# Patient Record
Sex: FEMALE | Race: BLACK OR AFRICAN AMERICAN | NOT HISPANIC OR LATINO | Employment: PART TIME | URBAN - METROPOLITAN AREA
[De-identification: names, ages, dates, MRNs, and addresses within clinical notes are randomized per-mention and may not be internally consistent; named-entity substitution may affect disease eponyms.]

---

## 2020-07-30 ENCOUNTER — TELEPHONE (OUTPATIENT)
Dept: OBSTETRICS AND GYNECOLOGY | Facility: CLINIC | Age: 40
End: 2020-07-30

## 2020-07-30 NOTE — TELEPHONE ENCOUNTER
----- Message from Stephenie Zhao sent at 7/30/2020 10:19 AM CDT -----  Type:  Patient Returning Call    Who Called:pt   Who Left Message for Patient:galina   Does the patient know what this is regarding?:appt   Would the patient rather a call back or a response via [facility]? Callback   Best Call Back Number:[phone]   Additional Information:

## 2020-07-30 NOTE — TELEPHONE ENCOUNTER
----- Message from Amy Guerra sent at 7/30/2020  9:18 AM CDT -----  Regarding: pt  Pt would like to schedule an appt. Pt would like to est care with Po Portillo. Epic wouldn't let me schedule  Please call back [phone]

## 2020-07-30 NOTE — TELEPHONE ENCOUNTER
----- Message from Shayne Hernandez sent at 7/30/2020  8:45 AM CDT -----  Contact: self  Type:  Patient Returning Call    Who Called:pt  Who Left Message for Patient:sascha  Does the patient know what this is regarding?:appt  Would the patient rather a call back or a response via [facility]? Call back  Best Call Back Number: [phone]  Additional Information: none

## 2020-07-30 NOTE — TELEPHONE ENCOUNTER
----- Message from Yudi Tripathi sent at 7/30/2020  8:31 AM CDT -----  Regarding: Former patient from 2015 or 2016  Contact: Patient  Patient has medicaid and is a former patient of Dr Maldonado and is hoping Dr Maldonado would see her as a GYN patient. Please call to advise at Ph .[phone] (home)

## 2020-07-30 NOTE — TELEPHONE ENCOUNTER
Advised pt that we have no record of her as a pt in recent years and can't take her Medicaid. Verb.understanding.

## 2022-01-23 ENCOUNTER — OUTSIDE PLACE OF SERVICE (OUTPATIENT)
Dept: SURGERY | Facility: CLINIC | Age: 42
End: 2022-01-23

## 2022-01-23 ENCOUNTER — OUTSIDE PLACE OF SERVICE (OUTPATIENT)
Dept: SURGERY | Facility: CLINIC | Age: 42
End: 2022-01-23
Payer: MEDICAID

## 2022-01-23 PROCEDURE — 99222 PR INITIAL HOSPITAL CARE,LEVL II: ICD-10-PCS | Mod: 57,,, | Performed by: SURGERY

## 2022-01-23 PROCEDURE — 44206 LAP PART COLECTOMY W/STOMA: CPT | Mod: ,,, | Performed by: SURGERY

## 2022-01-23 PROCEDURE — 44206 PR LAP,SURG,COLECTOMY,W/END COLOST & CLOSUR: ICD-10-PCS | Mod: ,,, | Performed by: SURGERY

## 2022-01-23 PROCEDURE — 99222 1ST HOSP IP/OBS MODERATE 55: CPT | Mod: 57,,, | Performed by: SURGERY

## 2022-01-24 LAB
ABS NRBC COUNT: 0 THOU/UL (ref 0–0.01)
ABSOLUTE BASOPHIL: 0 10*3/UL (ref 0–0.3)
ABSOLUTE EOSINOPHIL: 0 10*3/UL (ref 0–0.6)
ABSOLUTE IMMATURE GRAN: 0.08 THOU/UL (ref 0–0.03)
ABSOLUTE LYMPHOCYTE: 1.4 10*3/UL (ref 1.2–4)
ABSOLUTE MONOCYTE: 1.1 10*3/UL (ref 0.1–0.8)
ANION GAP SERPL CALC-SCNC: 9 MMOL/L (ref 3–11)
BASOPHILS NFR BLD: 0.1 % (ref 0–3)
BUN SERPL-MCNC: 8 MG/DL (ref 7–18)
CALCIUM SERPL-MCNC: 9.2 MG/DL (ref 8.5–10.1)
CHLORIDE SERPL-SCNC: 100 MMOL/L (ref 98–107)
CO2 SERPL-SCNC: 24 MMOL/L (ref 21–32)
CREAT SERPL-MCNC: 0.72 MG/DL (ref 0.55–1.02)
EOSINOPHIL NFR BLD: 0.2 % (ref 0–6)
ERYTHROCYTE [DISTWIDTH] IN BLOOD BY AUTOMATED COUNT: 16.8 % (ref 0–15.5)
GFR ESTIMATION: > 60
GLUCOSE SERPL-MCNC: 108 MG/DL (ref 74–106)
HCT VFR BLD AUTO: 40.1 % (ref 37–47)
HGB BLD-MCNC: 12.2 G/DL (ref 12–16)
IMMATURE GRANULOCYTES: 0.5 % (ref 0–0.43)
LYMPHOCYTES NFR BLD: 8.2 % (ref 20–45)
MCH RBC QN AUTO: 23.9 PG (ref 27–32)
MCHC RBC AUTO-ENTMCNC: 30.4 % (ref 32–36)
MCV RBC AUTO: 78.5 FL (ref 80–99)
MONOCYTES NFR BLD: 6 % (ref 2–10)
NEUTROPHILS # BLD AUTO: 14.9 10*3/UL (ref 1.4–7)
NEUTROPHILS NFR BLD: 85 % (ref 50–80)
NUCLEATED RED BLOOD CELLS: 0 % (ref 0–0.2)
PERIPHERAL SMEAR: NORMAL
PLATELETS: 510 10*3/UL (ref 130–400)
PMV BLD AUTO: 9.3 FL (ref 9.2–12.2)
POTASSIUM SERPL-SCNC: 4.1 MMOL/L (ref 3.5–5.1)
RBC # BLD AUTO: 5.11 10*6/UL (ref 4.2–5.4)
SODIUM BLD-SCNC: 133 MMOL/L (ref 131–143)
WBC # BLD: 17.5 10*3/UL (ref 4.5–10)

## 2022-01-25 LAB
ABS NRBC COUNT: 0 THOU/UL (ref 0–0.01)
ANION GAP SERPL CALC-SCNC: 13 MMOL/L (ref 3–11)
BUN SERPL-MCNC: 9 MG/DL (ref 7–18)
CALCIUM SERPL-MCNC: 8.7 MG/DL (ref 8.5–10.1)
CHLORIDE SERPL-SCNC: 103 MMOL/L (ref 98–107)
CO2 SERPL-SCNC: 20 MMOL/L (ref 21–32)
CREAT SERPL-MCNC: 0.68 MG/DL (ref 0.55–1.02)
ERYTHROCYTE [DISTWIDTH] IN BLOOD BY AUTOMATED COUNT: 17.1 % (ref 0–15.5)
GFR ESTIMATION: > 60
GLUCOSE SERPL-MCNC: 83 MG/DL (ref 74–106)
HCT VFR BLD AUTO: 39 % (ref 37–47)
HGB BLD-MCNC: 11.8 G/DL (ref 12–16)
MAGNESIUM SERPL-MCNC: 2.8 MG/DL (ref 1.6–2.6)
MCH RBC QN AUTO: 24 PG (ref 27–32)
MCHC RBC AUTO-ENTMCNC: 30.3 % (ref 32–36)
MCV RBC AUTO: 79.3 FL (ref 80–99)
NUCLEATED RED BLOOD CELLS: 0 % (ref 0–0.2)
PHOSPHATE FLD-MCNC: 3.7 MG/DL (ref 4.5–6.7)
PLATELETS: 383 10*3/UL (ref 130–400)
PMV BLD AUTO: 11 FL (ref 9.2–12.2)
POTASSIUM SERPL-SCNC: 4.3 MMOL/L (ref 3.5–5.1)
RBC # BLD AUTO: 4.92 10*6/UL (ref 4.2–5.4)
SODIUM BLD-SCNC: 136 MMOL/L (ref 131–143)
WBC # BLD: 13.3 10*3/UL (ref 4.5–10)

## 2022-01-26 LAB
ABS NRBC COUNT: 0 THOU/UL (ref 0–0.01)
ANION GAP SERPL CALC-SCNC: 8 MMOL/L (ref 3–11)
BUN SERPL-MCNC: 9 MG/DL (ref 7–18)
CALCIUM SERPL-MCNC: 8.6 MG/DL (ref 8.5–10.1)
CHLORIDE SERPL-SCNC: 103 MMOL/L (ref 98–107)
CO2 SERPL-SCNC: 25 MMOL/L (ref 21–32)
CREAT SERPL-MCNC: 0.62 MG/DL (ref 0.55–1.02)
ERYTHROCYTE [DISTWIDTH] IN BLOOD BY AUTOMATED COUNT: 16.6 % (ref 0–15.5)
GFR ESTIMATION: > 60
GLUCOSE SERPL-MCNC: 77 MG/DL (ref 74–106)
HCT VFR BLD AUTO: 34.8 % (ref 37–47)
HGB BLD-MCNC: 10.4 G/DL (ref 12–16)
MCH RBC QN AUTO: 23.9 PG (ref 27–32)
MCHC RBC AUTO-ENTMCNC: 29.9 % (ref 32–36)
MCV RBC AUTO: 79.8 FL (ref 80–99)
NUCLEATED RED BLOOD CELLS: 0 % (ref 0–0.2)
PLATELETS: 441 10*3/UL (ref 130–400)
PMV BLD AUTO: 8.8 FL (ref 9.2–12.2)
POTASSIUM SERPL-SCNC: 3.9 MMOL/L (ref 3.5–5.1)
RBC # BLD AUTO: 4.36 10*6/UL (ref 4.2–5.4)
SODIUM BLD-SCNC: 136 MMOL/L (ref 131–143)
SPECIMEN TO PATHOLOGY: NORMAL
TSH SERPL DL<=0.005 MIU/L-ACNC: 2.15 UIU/ML (ref 0.36–3.74)
WBC # BLD: 11.2 10*3/UL (ref 4.5–10)

## 2022-01-27 LAB
ABS NRBC COUNT: 0 THOU/UL (ref 0–0.01)
ANION GAP SERPL CALC-SCNC: 8 MMOL/L (ref 3–11)
BUN SERPL-MCNC: 9 MG/DL (ref 7–18)
CALCIUM SERPL-MCNC: 8.7 MG/DL (ref 8.5–10.1)
CHLORIDE SERPL-SCNC: 101 MMOL/L (ref 98–107)
CO2 SERPL-SCNC: 25 MMOL/L (ref 21–32)
CREAT SERPL-MCNC: 0.62 MG/DL (ref 0.55–1.02)
ERYTHROCYTE [DISTWIDTH] IN BLOOD BY AUTOMATED COUNT: 16.4 % (ref 0–15.5)
GFR ESTIMATION: > 60
GLUCOSE SERPL-MCNC: 68 MG/DL (ref 74–106)
HCT VFR BLD AUTO: 32.4 % (ref 37–47)
HGB BLD-MCNC: 10.1 G/DL (ref 12–16)
MCH RBC QN AUTO: 24 PG (ref 27–32)
MCHC RBC AUTO-ENTMCNC: 31.2 % (ref 32–36)
MCV RBC AUTO: 77.1 FL (ref 80–99)
NUCLEATED RED BLOOD CELLS: 0 % (ref 0–0.2)
PLATELETS: 470 10*3/UL (ref 130–400)
PMV BLD AUTO: 9.2 FL (ref 9.2–12.2)
POTASSIUM SERPL-SCNC: 3.7 MMOL/L (ref 3.5–5.1)
RBC # BLD AUTO: 4.2 10*6/UL (ref 4.2–5.4)
SODIUM BLD-SCNC: 134 MMOL/L (ref 131–143)
WBC # BLD: 11.7 10*3/UL (ref 4.5–10)

## 2022-01-30 LAB
ABS NRBC COUNT: 0 THOU/UL (ref 0–0.01)
ANION GAP SERPL CALC-SCNC: 7 MMOL/L (ref 3–11)
BUN SERPL-MCNC: 6 MG/DL (ref 7–18)
CALCIUM SERPL-MCNC: 8.9 MG/DL (ref 8.5–10.1)
CHLORIDE SERPL-SCNC: 102 MMOL/L (ref 98–107)
CO2 SERPL-SCNC: 28 MMOL/L (ref 21–32)
CREAT SERPL-MCNC: 0.72 MG/DL (ref 0.55–1.02)
ERYTHROCYTE [DISTWIDTH] IN BLOOD BY AUTOMATED COUNT: 16.5 % (ref 0–15.5)
GFR ESTIMATION: > 60
GLUCOSE SERPL-MCNC: 87 MG/DL (ref 74–106)
HCT VFR BLD AUTO: 35.4 % (ref 37–47)
HGB BLD-MCNC: 10.5 G/DL (ref 12–16)
MCH RBC QN AUTO: 23.7 PG (ref 27–32)
MCHC RBC AUTO-ENTMCNC: 29.7 % (ref 32–36)
MCV RBC AUTO: 79.9 FL (ref 80–99)
NUCLEATED RED BLOOD CELLS: 0 % (ref 0–0.2)
PLATELETS: 531 10*3/UL (ref 130–400)
PMV BLD AUTO: 8.8 FL (ref 9.2–12.2)
POTASSIUM SERPL-SCNC: 3.8 MMOL/L (ref 3.5–5.1)
RBC # BLD AUTO: 4.43 10*6/UL (ref 4.2–5.4)
SODIUM BLD-SCNC: 137 MMOL/L (ref 131–143)
WBC # BLD: 8.1 10*3/UL (ref 4.5–10)

## 2022-02-01 ENCOUNTER — OUTSIDE PLACE OF SERVICE (OUTPATIENT)
Dept: SURGERY | Facility: CLINIC | Age: 42
End: 2022-02-01
Payer: MEDICAID

## 2022-02-01 LAB
ABS NRBC COUNT: 0 THOU/UL (ref 0–0.01)
ANION GAP SERPL CALC-SCNC: 10 MMOL/L (ref 3–11)
BUN SERPL-MCNC: 8 MG/DL (ref 7–18)
CALCIUM SERPL-MCNC: 8.7 MG/DL (ref 8.5–10.1)
CHLORIDE SERPL-SCNC: 100 MMOL/L (ref 98–107)
CO2 SERPL-SCNC: 23 MMOL/L (ref 21–32)
CREAT SERPL-MCNC: 0.66 MG/DL (ref 0.55–1.02)
ERYTHROCYTE [DISTWIDTH] IN BLOOD BY AUTOMATED COUNT: 16.7 % (ref 0–15.5)
GFR ESTIMATION: > 60
GLUCOSE SERPL-MCNC: 79 MG/DL (ref 74–106)
HCT VFR BLD AUTO: 36 % (ref 37–47)
HGB BLD-MCNC: 10.9 G/DL (ref 12–16)
MCH RBC QN AUTO: 23.9 PG (ref 27–32)
MCHC RBC AUTO-ENTMCNC: 30.3 % (ref 32–36)
MCV RBC AUTO: 78.9 FL (ref 80–99)
NUCLEATED RED BLOOD CELLS: 0 % (ref 0–0.2)
PLATELETS: 548 10*3/UL (ref 130–400)
PMV BLD AUTO: 9 FL (ref 9.2–12.2)
POTASSIUM SERPL-SCNC: 3.4 MMOL/L (ref 3.5–5.1)
RBC # BLD AUTO: 4.56 10*6/UL (ref 4.2–5.4)
SODIUM BLD-SCNC: 133 MMOL/L (ref 131–143)
WBC # BLD: 10.2 10*3/UL (ref 4.5–10)

## 2022-02-01 PROCEDURE — 99024 PR POST-OP FOLLOW-UP VISIT: ICD-10-PCS | Mod: ,,, | Performed by: SURGERY

## 2022-02-01 PROCEDURE — 99024 POSTOP FOLLOW-UP VISIT: CPT | Mod: ,,, | Performed by: SURGERY

## 2022-02-03 ENCOUNTER — TELEPHONE (OUTPATIENT)
Dept: SURGERY | Facility: CLINIC | Age: 42
End: 2022-02-03
Payer: MEDICAID

## 2022-02-03 RX ORDER — ONDANSETRON 4 MG/1
8 TABLET, FILM COATED ORAL EVERY 8 HOURS PRN
COMMUNITY
Start: 2022-02-03 | End: 2022-03-03

## 2022-02-03 NOTE — TELEPHONE ENCOUNTER
----- Message from Cinthya Chawla sent at 2/3/2022  9:05 AM CST -----  Regarding: medicine  Patient had surgery with Dr. Recio and was discarded on 2/1/22 and patient states she was not given any medicine for nausea or her high blood pressure. Good phone #: [phone]

## 2022-02-03 NOTE — TELEPHONE ENCOUNTER
Pt s/p Sigmoid colectomy, creation of colostomy,... . 01-23-22, d/c 02-01-22    Pt states she is still nauseated and would like something to take for it.  Will get with dr. Recio.

## 2022-02-03 NOTE — TELEPHONE ENCOUNTER
Per Dr. Recio: zofran 4 mg.    Zofran 4 mg   1 tab PO Q8H PRN  #30, 0 refills    Called in to pharmacy ([facility] )

## 2022-02-14 ENCOUNTER — OFFICE VISIT (OUTPATIENT)
Dept: SURGERY | Facility: CLINIC | Age: 42
End: 2022-02-14
Payer: MEDICAID

## 2022-02-14 VITALS — HEIGHT: 66 IN | WEIGHT: 293 LBS | BODY MASS INDEX: 47.09 KG/M2 | RESPIRATION RATE: 20 BRPM

## 2022-02-14 DIAGNOSIS — Z76.89 ENCOUNTER TO ESTABLISH CARE: ICD-10-CM

## 2022-02-14 DIAGNOSIS — K57.20 PERFORATION OF SIGMOID COLON DUE TO DIVERTICULITIS: Primary | ICD-10-CM

## 2022-02-14 PROCEDURE — 1160F RVW MEDS BY RX/DR IN RCRD: CPT | Mod: CPTII,S$GLB,, | Performed by: SURGERY

## 2022-02-14 PROCEDURE — 1159F PR MEDICATION LIST DOCUMENTED IN MEDICAL RECORD: ICD-10-PCS | Mod: CPTII,S$GLB,, | Performed by: SURGERY

## 2022-02-14 PROCEDURE — 99024 PR POST-OP FOLLOW-UP VISIT: ICD-10-PCS | Mod: S$GLB,,, | Performed by: SURGERY

## 2022-02-14 PROCEDURE — 99024 POSTOP FOLLOW-UP VISIT: CPT | Mod: S$GLB,,, | Performed by: SURGERY

## 2022-02-14 PROCEDURE — 3008F BODY MASS INDEX DOCD: CPT | Mod: CPTII,S$GLB,, | Performed by: SURGERY

## 2022-02-14 PROCEDURE — 1159F MED LIST DOCD IN RCRD: CPT | Mod: CPTII,S$GLB,, | Performed by: SURGERY

## 2022-02-14 PROCEDURE — 3008F PR BODY MASS INDEX (BMI) DOCUMENTED: ICD-10-PCS | Mod: CPTII,S$GLB,, | Performed by: SURGERY

## 2022-02-14 PROCEDURE — 1160F PR REVIEW ALL MEDS BY PRESCRIBER/CLIN PHARMACIST DOCUMENTED: ICD-10-PCS | Mod: CPTII,S$GLB,, | Performed by: SURGERY

## 2022-02-14 RX ORDER — BENZONATATE 100 MG/1
100 CAPSULE ORAL
COMMUNITY
End: 2022-10-18

## 2022-02-14 RX ORDER — POTASSIUM CHLORIDE 20 MEQ/1
TABLET, EXTENDED RELEASE ORAL
COMMUNITY

## 2022-02-14 RX ORDER — METOPROLOL TARTRATE 50 MG/1
50 TABLET ORAL DAILY
COMMUNITY
Start: 2022-02-03

## 2022-02-14 RX ORDER — OXYCODONE AND ACETAMINOPHEN 5; 325 MG/1; MG/1
TABLET ORAL
COMMUNITY
Start: 2022-02-01 | End: 2023-01-31

## 2022-02-14 RX ORDER — CLONIDINE HYDROCHLORIDE 0.2 MG/1
TABLET ORAL
COMMUNITY
Start: 2022-02-12 | End: 2022-03-02

## 2022-02-14 NOTE — PROGRESS NOTES
Patient doing well in the postoperative period, incisions are all healing well, not having any problems with her colostomy bag.  Patient will need an evaluation by the gynecology team to evaluate for hysterectomy, ideally the patient would get a hysterectomy and reversal at the same time.  Patient is also morbidly obese which is going to make her high risk for complications during surgery.  Will have the patient evaluated here locally by the gynecology team may need transfer for higher level of care for a definitive dual team surgery.

## 2022-03-02 ENCOUNTER — OFFICE VISIT (OUTPATIENT)
Dept: FAMILY MEDICINE | Facility: CLINIC | Age: 42
End: 2022-03-02
Payer: MEDICAID

## 2022-03-02 ENCOUNTER — PATIENT MESSAGE (OUTPATIENT)
Dept: FAMILY MEDICINE | Facility: CLINIC | Age: 42
End: 2022-03-02

## 2022-03-02 VITALS
WEIGHT: 293 LBS | SYSTOLIC BLOOD PRESSURE: 168 MMHG | HEIGHT: 66 IN | TEMPERATURE: 98 F | OXYGEN SATURATION: 98 % | DIASTOLIC BLOOD PRESSURE: 94 MMHG | RESPIRATION RATE: 16 BRPM | HEART RATE: 78 BPM | BODY MASS INDEX: 47.09 KG/M2

## 2022-03-02 DIAGNOSIS — I10 ESSENTIAL HYPERTENSION: ICD-10-CM

## 2022-03-02 DIAGNOSIS — Z00.00 ROUTINE ADULT HEALTH MAINTENANCE: ICD-10-CM

## 2022-03-02 DIAGNOSIS — Z12.31 BREAST CANCER SCREENING BY MAMMOGRAM: ICD-10-CM

## 2022-03-02 DIAGNOSIS — Z13.21 SCREENING FOR ENDOCRINE, NUTRITIONAL, METABOLIC AND IMMUNITY DISORDER: ICD-10-CM

## 2022-03-02 DIAGNOSIS — Z11.59 ENCOUNTER FOR SCREENING FOR OTHER VIRAL DISEASES: ICD-10-CM

## 2022-03-02 DIAGNOSIS — Z76.89 ENCOUNTER TO ESTABLISH CARE: Primary | ICD-10-CM

## 2022-03-02 DIAGNOSIS — M25.562 ACUTE PAIN OF LEFT KNEE: ICD-10-CM

## 2022-03-02 DIAGNOSIS — F41.9 ANXIETY: ICD-10-CM

## 2022-03-02 DIAGNOSIS — R03.0 ELEVATED BLOOD PRESSURE READING: ICD-10-CM

## 2022-03-02 DIAGNOSIS — Z13.220 ENCOUNTER FOR LIPID SCREENING FOR CARDIOVASCULAR DISEASE: ICD-10-CM

## 2022-03-02 DIAGNOSIS — Z13.29 SCREENING FOR ENDOCRINE, NUTRITIONAL, METABOLIC AND IMMUNITY DISORDER: ICD-10-CM

## 2022-03-02 DIAGNOSIS — Z13.228 SCREENING FOR ENDOCRINE, NUTRITIONAL, METABOLIC AND IMMUNITY DISORDER: ICD-10-CM

## 2022-03-02 DIAGNOSIS — Z13.0 SCREENING FOR ENDOCRINE, NUTRITIONAL, METABOLIC AND IMMUNITY DISORDER: ICD-10-CM

## 2022-03-02 DIAGNOSIS — Z13.6 ENCOUNTER FOR LIPID SCREENING FOR CARDIOVASCULAR DISEASE: ICD-10-CM

## 2022-03-02 DIAGNOSIS — Z93.9 HISTORY OF CREATION OF OSTOMY: ICD-10-CM

## 2022-03-02 PROBLEM — R53.83 PERSISTENT FATIGUE AFTER COVID-19: Status: ACTIVE | Noted: 2022-03-02

## 2022-03-02 PROBLEM — U09.9 PERSISTENT FATIGUE AFTER COVID-19: Status: ACTIVE | Noted: 2022-03-02

## 2022-03-02 PROBLEM — K57.20 PERFORATION OF SIGMOID COLON DUE TO DIVERTICULITIS: Status: ACTIVE | Noted: 2022-03-02

## 2022-03-02 PROBLEM — E87.6 HYPOKALEMIA: Status: ACTIVE | Noted: 2022-03-02

## 2022-03-02 PROBLEM — K57.20 DIVERTICULITIS OF LARGE INTESTINE WITH PERFORATION: Status: ACTIVE | Noted: 2022-03-02

## 2022-03-02 PROBLEM — E66.9 OBESITY: Status: ACTIVE | Noted: 2022-03-02

## 2022-03-02 PROBLEM — U07.1 COVID-19: Status: ACTIVE | Noted: 2022-03-02

## 2022-03-02 LAB
ABS NRBC COUNT: 0 X 10 3/UL (ref 0–0.01)
ABSOLUTE BASOPHIL: 0.02 X 10 3/UL (ref 0–0.22)
ABSOLUTE EOSINOPHIL: 0.13 X 10 3/UL (ref 0.04–0.54)
ABSOLUTE IMMATURE GRAN: 0.01 X 10 3/UL (ref 0–0.04)
ABSOLUTE LYMPHOCYTE: 2.07 X 10 3/UL (ref 0.86–4.75)
ABSOLUTE MONOCYTE: 0.47 X 10 3/UL (ref 0.22–1.08)
ALBUMIN SERPL-MCNC: 4.3 G/DL (ref 3.5–5.2)
ALBUMIN/GLOB SERPL ELPH: 1.3 {RATIO} (ref 1–2.7)
ALP ISOS SERPL LEV INH-CCNC: 51 U/L (ref 35–105)
ALT (SGPT): 6 U/L (ref 0–33)
ANION GAP SERPL CALC-SCNC: 9 MMOL/L (ref 8–17)
AST SERPL-CCNC: 10 U/L (ref 0–32)
BASOPHILS NFR BLD: 0.3 % (ref 0.2–1.2)
BILIRUBIN, TOTAL: 0.77 MG/DL (ref 0–1.2)
BUN/CREAT SERPL: 10.7 (ref 6–20)
CALCIUM SERPL-MCNC: 9.5 MG/DL (ref 8.6–10.2)
CARBON DIOXIDE, CO2: 25 MMOL/L (ref 22–29)
CHLORIDE: 103 MMOL/L (ref 98–107)
CHOLEST SERPL-MSCNC: 332 MG/DL (ref 100–200)
CREAT SERPL-MCNC: 0.84 MG/DL (ref 0.5–0.9)
EOSINOPHIL NFR BLD: 1.6 % (ref 0.7–7)
ESTIMATED AVERAGE GLUCOSE: 116 MG/DL
GFR ESTIMATION: 74
GLOBULIN: 3.2 G/DL (ref 1.5–4.5)
GLUCOSE: 94 MG/DL (ref 74–106)
HBA1C MFR BLD: 5.7 % (ref 4–6)
HCT VFR BLD AUTO: 38.7 % (ref 37–47)
HCV IGG SERPL QL IA: NONREACTIVE
HDLC SERPL-MCNC: 43 MG/DL
HGB BLD-MCNC: 11.8 G/DL (ref 12–16)
HIV 1+2 AB+HIV1 P24 AG SERPL QL IA: NONREACTIVE
IMMATURE GRANULOCYTES: 0.1 % (ref 0–0.5)
LDL/HDL RATIO: 6.2 (ref 1–3)
LDLC SERPL CALC-MCNC: 268.2 MG/DL (ref 0–100)
LYMPHOCYTES NFR BLD: 25.9 % (ref 19.3–53.1)
MCH RBC QN AUTO: 23.7 PG (ref 27–32)
MCHC RBC AUTO-ENTMCNC: 30.5 G/DL (ref 32–36)
MCV RBC AUTO: 77.7 FL (ref 82–100)
MONOCYTES NFR BLD: 5.9 % (ref 4.7–12.5)
NEUTROPHILS # BLD AUTO: 5.28 X 10 3/UL (ref 2.15–7.56)
NEUTROPHILS NFR BLD: 66.2 % (ref 34–71.1)
NUCLEATED RED BLOOD CELLS: 0 /100 WBC (ref 0–0.2)
PLATELET # BLD AUTO: 427 X 10 3/UL (ref 135–400)
POTASSIUM: 4.3 MMOL/L (ref 3.5–5.1)
PROT SNV-MCNC: 7.5 G/DL (ref 6.4–8.3)
RBC # BLD AUTO: 4.98 X 10 6/UL (ref 4.2–5.4)
RDW-SD: 49 FL (ref 37–54)
SODIUM: 137 MMOL/L (ref 136–145)
TRIGL SERPL-MCNC: 104 MG/DL (ref 0–150)
TSH SERPL DL<=0.005 MIU/L-ACNC: 1.48 UIU/ML (ref 0.27–4.2)
UREA NITROGEN (BUN): 9 MG/DL (ref 6–20)
WBC # BLD: 7.98 X 10 3/UL (ref 4.3–10.8)

## 2022-03-02 PROCEDURE — 3080F PR MOST RECENT DIASTOLIC BLOOD PRESSURE >= 90 MM HG: ICD-10-PCS | Mod: CPTII,S$GLB,, | Performed by: OBSTETRICS & GYNECOLOGY

## 2022-03-02 PROCEDURE — 3080F DIAST BP >= 90 MM HG: CPT | Mod: CPTII,S$GLB,, | Performed by: OBSTETRICS & GYNECOLOGY

## 2022-03-02 PROCEDURE — 3008F PR BODY MASS INDEX (BMI) DOCUMENTED: ICD-10-PCS | Mod: CPTII,S$GLB,, | Performed by: OBSTETRICS & GYNECOLOGY

## 2022-03-02 PROCEDURE — 3008F BODY MASS INDEX DOCD: CPT | Mod: CPTII,S$GLB,, | Performed by: OBSTETRICS & GYNECOLOGY

## 2022-03-02 PROCEDURE — 1160F PR REVIEW ALL MEDS BY PRESCRIBER/CLIN PHARMACIST DOCUMENTED: ICD-10-PCS | Mod: CPTII,S$GLB,, | Performed by: OBSTETRICS & GYNECOLOGY

## 2022-03-02 PROCEDURE — 1160F RVW MEDS BY RX/DR IN RCRD: CPT | Mod: CPTII,S$GLB,, | Performed by: OBSTETRICS & GYNECOLOGY

## 2022-03-02 PROCEDURE — 99204 OFFICE O/P NEW MOD 45 MIN: CPT | Mod: S$GLB,,, | Performed by: OBSTETRICS & GYNECOLOGY

## 2022-03-02 PROCEDURE — 1159F MED LIST DOCD IN RCRD: CPT | Mod: CPTII,S$GLB,, | Performed by: OBSTETRICS & GYNECOLOGY

## 2022-03-02 PROCEDURE — 3077F PR MOST RECENT SYSTOLIC BLOOD PRESSURE >= 140 MM HG: ICD-10-PCS | Mod: CPTII,S$GLB,, | Performed by: OBSTETRICS & GYNECOLOGY

## 2022-03-02 PROCEDURE — 99204 PR OFFICE/OUTPT VISIT, NEW, LEVL IV, 45-59 MIN: ICD-10-PCS | Mod: S$GLB,,, | Performed by: OBSTETRICS & GYNECOLOGY

## 2022-03-02 PROCEDURE — 3077F SYST BP >= 140 MM HG: CPT | Mod: CPTII,S$GLB,, | Performed by: OBSTETRICS & GYNECOLOGY

## 2022-03-02 PROCEDURE — 1159F PR MEDICATION LIST DOCUMENTED IN MEDICAL RECORD: ICD-10-PCS | Mod: CPTII,S$GLB,, | Performed by: OBSTETRICS & GYNECOLOGY

## 2022-03-02 RX ORDER — ESCITALOPRAM OXALATE 10 MG/1
10 TABLET ORAL DAILY
Qty: 30 TABLET | Refills: 6 | Status: SHIPPED | OUTPATIENT
Start: 2022-03-02 | End: 2022-03-02

## 2022-03-02 RX ORDER — ALPRAZOLAM 0.5 MG/1
0.5 TABLET ORAL 2 TIMES DAILY PRN
Qty: 30 TABLET | Refills: 0 | Status: SHIPPED | OUTPATIENT
Start: 2022-03-02 | End: 2022-04-18 | Stop reason: SDUPTHER

## 2022-03-02 RX ORDER — CLONIDINE HYDROCHLORIDE 0.2 MG/1
0.2 TABLET ORAL 3 TIMES DAILY
COMMUNITY
End: 2022-04-29 | Stop reason: SDUPTHER

## 2022-03-02 RX ORDER — MELOXICAM 15 MG/1
15 TABLET ORAL DAILY
Qty: 14 TABLET | Refills: 0 | Status: SHIPPED | OUTPATIENT
Start: 2022-03-02 | End: 2023-01-31

## 2022-03-02 RX ORDER — AMLODIPINE BESYLATE 10 MG/1
10 TABLET ORAL DAILY
COMMUNITY
End: 2022-03-09 | Stop reason: SDUPTHER

## 2022-03-02 RX ORDER — ESCITALOPRAM OXALATE 10 MG/1
10 TABLET ORAL DAILY
Qty: 30 TABLET | Refills: 6 | Status: SHIPPED | OUTPATIENT
Start: 2022-03-02 | End: 2022-06-10

## 2022-03-02 NOTE — PROGRESS NOTES
Subjective:   Patient ID: Lizbeth Leonard is a 42 y.o. female who presents for evaluation today.    Chief Complaint:  Establish Care    History of Present Illness  HPI   Lizbeth is a 42 y.o. y.o.female who presents to clinic today to establish care with me.  She was previously under the care of N/A. The patient reports a PMH of hypertension.  Has never had a mammogram. Also, due for pap smear. Seeing Dr. Gabe Correia tomorrow.    Being seen by [facility] once weekly for care of newly placed colostomy.     Hypertension  Chronic  High blood pressure was first noted several years ago.  Home blood pressure readings: 132/90, 142/90, 132/78, 138/78    Diet type, salt intake: general   Use of agents associated with hypertension: none  (anticholinergics, amphetamines, anorectics, corticosteroids, decongestants, estrogens or OCP, NSAIDs, thyroid hormones, topical mineralocorticoids)  Associated signs and symptoms: none.   Patient specifically denies: blurred vision, chest pain, dyspnea, headache, neck aches, orthopnea, palpitations, paroxysmal nocturnal dyspnea, peripheral edema, pulsating in the ears and tiredness/fatigue.   Medication compliance: taking as prescribed.  History of target organ damage: none.  Cardiovascular risk factors are hypertension and obesity (BMI >= 30 kg/m2).   Family history is negative for hypertension, diabetes, or cardiovascular disease.    Anxiety/Depression  Patient reports symptoms of anxiety.   Onset of symptoms was approximately several years ago.    Symptoms have been gradually worsening since that time.   She reports the following symptoms: racing thoughts.   Denies suicidal attempt, suicidal thoughts, suicidal thoughts with specific plan and suicidal thoughts without plan.   Previous treatment includes medication Xanax.   She reports the following medication side effects: none.    Acute Knee Pain  Was going up the stairs and knee popped. This occurred in September. sicne that  time has been experiencing intermittent left knee pain.     FAMILY HISTORY  History reviewed. No pertinent family history.  SOCIAL HISTORY  Social History     Tobacco Use   Smoking Status Never Smoker   Smokeless Tobacco Never Used   ,   Social History     Substance and Sexual Activity   Alcohol Use Not Currently     MEDICATIONS  Outpatient Medications Marked as Taking for the 3/2/22 encounter (Office Visit) with Kerry Tomas NP   Medication Sig Dispense Refill    amLODIPine (NORVASC) 10 MG tablet Take 10 mg by mouth once daily.      benzonatate (TESSALON) 100 MG capsule 100 mg.      cloNIDine (CATAPRES) 0.2 MG tablet Take 0.2 mg by mouth 3 (three) times daily.      metoprolol tartrate (LOPRESSOR) 50 MG tablet Take 50 mg by mouth once daily.      oxyCODONE-acetaminophen (PERCOCET) 5-325 mg per tablet Every 6 Hours as needed for Pain      potassium chloride SA (K-DUR,KLOR-CON) 20 MEQ tablet Daily      [DISCONTINUED] cloNIDine (CATAPRES) 0.2 MG tablet        Review of Systems   Review of Systems   Constitutional: Negative for activity change, appetite change, fever and unexpected weight change.   HENT: Negative for dental problem, hearing loss, sneezing, sore throat, trouble swallowing and voice change.    Eyes: Negative for visual disturbance.   Respiratory: Negative for choking, chest tightness, shortness of breath and stridor.    Cardiovascular: Negative for chest pain and palpitations.   Gastrointestinal: Negative for abdominal pain, anal bleeding, blood in stool, change in bowel habit, nausea, vomiting, fecal incontinence and change in bowel habit.   Endocrine: Negative for polydipsia, polyphagia and polyuria.   Genitourinary: Negative for decreased urine volume, difficulty urinating, dysuria, frequency, hematuria and urgency.   Musculoskeletal: Positive for arthralgias. Negative for gait problem, joint swelling, neck pain, neck stiffness and joint deformity.   Integumentary:  Negative for color change,  "pallor, rash, wound and mole/lesion.   Allergic/Immunologic: Negative for immunocompromised state.   Neurological: Negative for vertigo, seizures, syncope, weakness, light-headedness, disturbances in coordination and coordination difficulties.   Hematological: Negative for adenopathy. Does not bruise/bleed easily.   Psychiatric/Behavioral: Negative for agitation, behavioral problems, confusion, hallucinations, sleep disturbance and suicidal ideas. The patient is nervous/anxious.          Objective:    VITALS  BP Readings from Last 1 Encounters:   03/02/22 (!) 168/94   Weight: (!) 147 kg (324 lb)   Height: 5' 6" (167.6 cm)   BMI Readings from Last 1 Encounters:   03/02/22 52.29 kg/m²       Physical Exam  Vitals reviewed.   Constitutional:       General: She is not in acute distress.     Appearance: Normal appearance. She is not ill-appearing, toxic-appearing or diaphoretic.   HENT:      Head: Normocephalic and atraumatic.      Right Ear: External ear normal.      Left Ear: External ear normal.      Nose: Nose normal. No congestion or rhinorrhea.   Eyes:      General:         Right eye: No discharge.         Left eye: No discharge.   Cardiovascular:      Rate and Rhythm: Normal rate and regular rhythm.      Heart sounds: Normal heart sounds. No murmur heard.    No friction rub. No gallop.   Pulmonary:      Effort: Pulmonary effort is normal. No respiratory distress.      Breath sounds: Normal breath sounds. No stridor. No wheezing, rhonchi or rales.   Abdominal:      Comments: ostomy   Musculoskeletal:         General: Normal range of motion.      Cervical back: Normal range of motion and neck supple.      Right lower leg: No edema.      Left lower leg: No edema.   Skin:     General: Skin is warm and dry.      Coloration: Skin is not jaundiced or pale.      Findings: No rash.   Neurological:      General: No focal deficit present.      Mental Status: She is alert and oriented to person, place, and time.      Gait: " Gait normal.   Psychiatric:         Mood and Affect: Mood is anxious.         Behavior: Behavior normal.         Thought Content: Thought content normal.         Judgment: Judgment normal.         Assessment:      1. Encounter to establish care    2. Routine adult health maintenance    3. Screening for endocrine, nutritional, metabolic and immunity disorder    4. Encounter for lipid screening for cardiovascular disease    5. Encounter for screening for other viral diseases    6. Essential hypertension    7. Elevated blood pressure reading    8. Anxiety    9. Acute pain of left knee    10. Breast cancer screening by mammogram       Plan:   Encounter to establish care    Routine adult health maintenance  -     Comprehensive Metabolic Panel; Future; Expected date: 03/02/2022  -     CBC Auto Differential; Future; Expected date: 03/02/2022  -     Lipid Panel; Future; Expected date: 03/02/2022  -     TSH; Future; Expected date: 03/02/2022  Age appropriate health promotion and maintenance discussed, including preventive health screenings indicated for patient.     Screening for endocrine, nutritional, metabolic and immunity disorder  -     Comprehensive Metabolic Panel; Future; Expected date: 03/02/2022  -     CBC Auto Differential; Future; Expected date: 03/02/2022  -     TSH; Future; Expected date: 03/02/2022  -     Hemoglobin A1C; Future; Expected date: 03/02/2022    Encounter for lipid screening for cardiovascular disease  -     Lipid Panel; Future; Expected date: 03/02/2022    Encounter for screening for other viral diseases  -     Hepatitis C Antibody; Future; Expected date: 03/02/2022  -     HIV 1/2 Ag/Ab (4th Gen); Future; Expected date: 03/02/2022    Essential hypertension  Decrease clonidine to BID. F/u 1 week.   Advised exercise and low salt diet high in vegetables, whole grains and low in meat/saturated fats. Reviewed importance of taking medication(s) daily. Side effects of medications reviewed. Monitor BP  daily at home. Keep a log of values & bring to next appointment. Report any abnormalities to provider. Report to nearest ER with chest pain, shortness of breath, markedly increased BP, dyspnea, headache, visual disturbance, etc.      Elevated blood pressure reading  Patient very anxious about appointment today. ER precautions advised.     Anxiety  -     EScitalopram oxalate (LEXAPRO) 10 MG tablet; Take 1 tablet (10 mg total) by mouth once daily.  Dispense: 30 tablet; Refill: 6  -     ALPRAZolam (XANAX) 0.5 MG tablet; Take 1 tablet (0.5 mg total) by mouth 2 (two) times daily as needed for Anxiety.  Dispense: 30 tablet; Refill: 0  Discussed medication regimen. Discussed maintenance medication vs. medication for acute attacks. F/u 4 weeks.   Educated regarding non-pharmacological methods for dealing with symptoms of anxiety such as, relaxation techniques, deep breathing exercises, exercise, etc. Discussion of pharmacological management was carried out at length. Medication regimen, side effects, mechanism of action, etc. discussed.  Please note the following aspects associated with the treatment regimen:   When first starting medication or making dose adjustments, it may take 2-4 weeks to begin feeling the effects of medication   Avoid abrupt withdrawal from treatment. If you wish to discontinue medication therapy, please contact the office before doing so to discuss how to safely get off the medication.   Instructed to report immediately to the nearest ER with any thoughts of suicidal or homicidal ideation. Please notify provider immediately.  o Resources are available for dealing with suicidal thoughts: [phone] ([phone]) or www.suicidepreventionlifeline.org    Contact provider immediately if symptoms worsen or new symptoms appear.   Report to the nearest emergency room if symptoms of serotonin syndrome are experienced such as sudden onset of high fever, muscular rigidity, mental status changes,  hyperreflexia/clonus, and uncontrolled shivering     Acute pain of left knee  -     meloxicam (MOBIC) 15 MG tablet; Take 1 tablet (15 mg total) by mouth once daily.  Dispense: 14 tablet; Refill: 0  Plan for short course of high dose NSAIDs to see if improvement occurs. If no improvement will plan for ortho referral.     Breast cancer screening by mammogram  -     Mammo Digital Screening Bilat; Future; Expected date: 03/02/2022  Mammogram ordered and faxed to [facility] today. Patient instructed to contact facility to schedule procedure if she has not received a call to schedule within the next 7 business days. Copy of order with facility contact information given to patient prior to leaving office today.    History of creation of ostomy    Patient instructed to contact the clinic should any questions or concerns arise prior to next office visit. Risks, benefits, and alternatives discussed with patient. Patient verbalized understanding of discussed plan of care. Asked patient if any further questions, answered no. Follow up as discussed or sooner PRN.

## 2022-03-03 ENCOUNTER — OFFICE VISIT (OUTPATIENT)
Dept: OBSTETRICS AND GYNECOLOGY | Facility: CLINIC | Age: 42
End: 2022-03-03
Payer: MEDICAID

## 2022-03-03 ENCOUNTER — PATIENT MESSAGE (OUTPATIENT)
Dept: FAMILY MEDICINE | Facility: CLINIC | Age: 42
End: 2022-03-03
Payer: MEDICAID

## 2022-03-03 VITALS
WEIGHT: 293 LBS | BODY MASS INDEX: 52.29 KG/M2 | HEART RATE: 78 BPM | SYSTOLIC BLOOD PRESSURE: 167 MMHG | DIASTOLIC BLOOD PRESSURE: 92 MMHG

## 2022-03-03 DIAGNOSIS — Z76.89 ENCOUNTER TO ESTABLISH CARE: ICD-10-CM

## 2022-03-03 DIAGNOSIS — D21.9 FIBROIDS: Primary | ICD-10-CM

## 2022-03-03 DIAGNOSIS — E78.2 MIXED HYPERLIPIDEMIA: Primary | ICD-10-CM

## 2022-03-03 DIAGNOSIS — D64.9 ANEMIA, UNSPECIFIED TYPE: Primary | ICD-10-CM

## 2022-03-03 PROCEDURE — 99214 OFFICE O/P EST MOD 30 MIN: CPT | Mod: 25,S$GLB,, | Performed by: OBSTETRICS & GYNECOLOGY

## 2022-03-03 PROCEDURE — 3080F DIAST BP >= 90 MM HG: CPT | Mod: CPTII,S$GLB,, | Performed by: OBSTETRICS & GYNECOLOGY

## 2022-03-03 PROCEDURE — 76856 US EXAM PELVIC COMPLETE: CPT | Mod: S$GLB,,, | Performed by: OBSTETRICS & GYNECOLOGY

## 2022-03-03 PROCEDURE — 3077F PR MOST RECENT SYSTOLIC BLOOD PRESSURE >= 140 MM HG: ICD-10-PCS | Mod: CPTII,S$GLB,, | Performed by: OBSTETRICS & GYNECOLOGY

## 2022-03-03 PROCEDURE — 3077F SYST BP >= 140 MM HG: CPT | Mod: CPTII,S$GLB,, | Performed by: OBSTETRICS & GYNECOLOGY

## 2022-03-03 PROCEDURE — 99214 PR OFFICE/OUTPT VISIT, EST, LEVL IV, 30-39 MIN: ICD-10-PCS | Mod: 25,S$GLB,, | Performed by: OBSTETRICS & GYNECOLOGY

## 2022-03-03 PROCEDURE — 3080F PR MOST RECENT DIASTOLIC BLOOD PRESSURE >= 90 MM HG: ICD-10-PCS | Mod: CPTII,S$GLB,, | Performed by: OBSTETRICS & GYNECOLOGY

## 2022-03-03 PROCEDURE — 3008F PR BODY MASS INDEX (BMI) DOCUMENTED: ICD-10-PCS | Mod: CPTII,S$GLB,, | Performed by: OBSTETRICS & GYNECOLOGY

## 2022-03-03 PROCEDURE — 3044F HG A1C LEVEL LT 7.0%: CPT | Mod: CPTII,S$GLB,, | Performed by: OBSTETRICS & GYNECOLOGY

## 2022-03-03 PROCEDURE — 3044F PR MOST RECENT HEMOGLOBIN A1C LEVEL <7.0%: ICD-10-PCS | Mod: CPTII,S$GLB,, | Performed by: OBSTETRICS & GYNECOLOGY

## 2022-03-03 PROCEDURE — 1159F PR MEDICATION LIST DOCUMENTED IN MEDICAL RECORD: ICD-10-PCS | Mod: CPTII,S$GLB,, | Performed by: OBSTETRICS & GYNECOLOGY

## 2022-03-03 PROCEDURE — 1159F MED LIST DOCD IN RCRD: CPT | Mod: CPTII,S$GLB,, | Performed by: OBSTETRICS & GYNECOLOGY

## 2022-03-03 PROCEDURE — 76856 US OB/GYN PROCEDURE (VIEWPOINT): ICD-10-PCS | Mod: S$GLB,,, | Performed by: OBSTETRICS & GYNECOLOGY

## 2022-03-03 PROCEDURE — 3008F BODY MASS INDEX DOCD: CPT | Mod: CPTII,S$GLB,, | Performed by: OBSTETRICS & GYNECOLOGY

## 2022-03-03 RX ORDER — ROSUVASTATIN CALCIUM 40 MG/1
40 TABLET, COATED ORAL NIGHTLY
Qty: 90 TABLET | Refills: 1 | Status: SHIPPED | OUTPATIENT
Start: 2022-03-03 | End: 2022-04-14

## 2022-03-03 NOTE — PROGRESS NOTES
Subjective:       Patient ID: Lizbeth Leonard is a 42 y.o. female.    Chief Complaint:  Consult      History of Present Illness  HPI  Pt here for surgical consult.  Has enlarged uterus.  Sent by dr vargas.  Needs colostomy reversal but uterus too big.  Had US today- uterus 16 cm in length and very broad.    Talked about lupron to shrink fibroids.  Talked about losing weight today as well    GYN & OB History  No LMP recorded.   Date of Last Pap: No result found    OB History    Para Term  AB Living   1 1       1   SAB IAB Ectopic Multiple Live Births                  # Outcome Date GA Lbr Damion/2nd Weight Sex Delivery Anes PTL Lv   1 Para                Review of Systems  Review of Systems   Constitutional: Negative for chills and fever.   Respiratory: Negative for shortness of breath.    Cardiovascular: Negative for chest pain.   Gastrointestinal: Negative for abdominal pain, blood in stool, constipation, diarrhea, nausea, vomiting and reflux.   Genitourinary: Negative for dysmenorrhea, dyspareunia, dysuria, hematuria, hot flashes, menorrhagia, menstrual problem, pelvic pain, vaginal bleeding, vaginal discharge, postcoital bleeding and vaginal dryness.   Musculoskeletal: Negative for arthralgias and joint swelling.   Integumentary:  Negative for rash and hair changes.   Psychiatric/Behavioral: Negative for depression. The patient is not nervous/anxious.            Objective:    Physical Exam:   Constitutional: She appears well-developed and well-nourished. No distress.    HENT:   Head: Normocephalic.    Eyes: Conjunctivae and EOM are normal.    Neck: No tracheal deviation present. No thyromegaly present.    Cardiovascular: Exam reveals no clubbing, no cyanosis and no edema.     Pulmonary/Chest: Effort normal. No respiratory distress.                  Musculoskeletal: Normal range of motion and moves all extremeties.        Skin: No rash noted. She is not diaphoretic. No cyanosis. Nails show no clubbing.     Psychiatric: She has a normal mood and affect. Her behavior is normal. Judgment and thought content normal.          Assessment:        1. Fibroids    2. Encounter to establish care               Plan:      lupron  Talked to dr vargas- recommend sx in [place]   Dietician referal  Weight loss

## 2022-03-07 ENCOUNTER — PATIENT MESSAGE (OUTPATIENT)
Dept: FAMILY MEDICINE | Facility: CLINIC | Age: 42
End: 2022-03-07
Payer: MEDICAID

## 2022-03-09 ENCOUNTER — OFFICE VISIT (OUTPATIENT)
Dept: FAMILY MEDICINE | Facility: CLINIC | Age: 42
End: 2022-03-09
Payer: MEDICAID

## 2022-03-09 ENCOUNTER — PATIENT MESSAGE (OUTPATIENT)
Dept: SURGERY | Facility: CLINIC | Age: 42
End: 2022-03-09
Payer: MEDICAID

## 2022-03-09 VITALS — DIASTOLIC BLOOD PRESSURE: 80 MMHG | SYSTOLIC BLOOD PRESSURE: 139 MMHG

## 2022-03-09 DIAGNOSIS — I10 HYPERTENSION, UNSPECIFIED TYPE: Primary | ICD-10-CM

## 2022-03-09 PROCEDURE — 1160F PR REVIEW ALL MEDS BY PRESCRIBER/CLIN PHARMACIST DOCUMENTED: ICD-10-PCS | Mod: CPTII,95,, | Performed by: OBSTETRICS & GYNECOLOGY

## 2022-03-09 PROCEDURE — 1160F RVW MEDS BY RX/DR IN RCRD: CPT | Mod: CPTII,95,, | Performed by: OBSTETRICS & GYNECOLOGY

## 2022-03-09 PROCEDURE — 3079F PR MOST RECENT DIASTOLIC BLOOD PRESSURE 80-89 MM HG: ICD-10-PCS | Mod: CPTII,95,, | Performed by: OBSTETRICS & GYNECOLOGY

## 2022-03-09 PROCEDURE — 99213 PR OFFICE/OUTPT VISIT, EST, LEVL III, 20-29 MIN: ICD-10-PCS | Mod: 95,,, | Performed by: OBSTETRICS & GYNECOLOGY

## 2022-03-09 PROCEDURE — 3075F SYST BP GE 130 - 139MM HG: CPT | Mod: CPTII,95,, | Performed by: OBSTETRICS & GYNECOLOGY

## 2022-03-09 PROCEDURE — 3079F DIAST BP 80-89 MM HG: CPT | Mod: CPTII,95,, | Performed by: OBSTETRICS & GYNECOLOGY

## 2022-03-09 PROCEDURE — 3044F HG A1C LEVEL LT 7.0%: CPT | Mod: CPTII,95,, | Performed by: OBSTETRICS & GYNECOLOGY

## 2022-03-09 PROCEDURE — 1159F PR MEDICATION LIST DOCUMENTED IN MEDICAL RECORD: ICD-10-PCS | Mod: CPTII,95,, | Performed by: OBSTETRICS & GYNECOLOGY

## 2022-03-09 PROCEDURE — 3044F PR MOST RECENT HEMOGLOBIN A1C LEVEL <7.0%: ICD-10-PCS | Mod: CPTII,95,, | Performed by: OBSTETRICS & GYNECOLOGY

## 2022-03-09 PROCEDURE — 3075F PR MOST RECENT SYSTOLIC BLOOD PRESS GE 130-139MM HG: ICD-10-PCS | Mod: CPTII,95,, | Performed by: OBSTETRICS & GYNECOLOGY

## 2022-03-09 PROCEDURE — 1159F MED LIST DOCD IN RCRD: CPT | Mod: CPTII,95,, | Performed by: OBSTETRICS & GYNECOLOGY

## 2022-03-09 PROCEDURE — 99213 OFFICE O/P EST LOW 20 MIN: CPT | Mod: 95,,, | Performed by: OBSTETRICS & GYNECOLOGY

## 2022-03-09 RX ORDER — AMLODIPINE BESYLATE 10 MG/1
10 TABLET ORAL DAILY
Qty: 90 TABLET | Refills: 1 | Status: SHIPPED | OUTPATIENT
Start: 2022-03-09 | End: 2022-09-26

## 2022-03-09 NOTE — PROGRESS NOTES
The patient location is: [place], [place]  The chief complaint leading to consultation is: follow up    Visit type: audiovisual    Face to Face time with patient: 10 minutes  15 minutes of total time spent on the encounter, which includes face to face time and non-face to face time preparing to see the patient (eg, review of tests), Obtaining and/or reviewing separately obtained history, Documenting clinical information in the electronic or other health record, Independently interpreting results (not separately reported) and communicating results to the patient/family/caregiver, or Care coordination (not separately reported).     Each patient to whom he or she provides medical services by telemedicine is:  (1) informed of the relationship between the physician and patient and the respective role of any other health care provider with respect to management of the patient; and (2) notified that he or she may decline to receive medical services by telemedicine and may withdraw from such care at any time.     Subjective:   Patient ID: Lizbeth Leonard is a 42 y.o. female who presents for evaluation today.    Chief Complaint:  No chief complaint on file.      History of Present Illness  HPI   Decreased clonidine to BID last week. Reports BP was 139/80 mmHg yesterday when the home health nurse visited. Associated signs and symptoms: none. Patient specifically denies: blurred vision, chest pain, dyspnea, headache, neck aches, orthopnea, palpitations, paroxysmal nocturnal dyspnea, peripheral edema, pulsating in the ears and tiredness/fatigue.   Medication compliance: taking as prescribed.    FAMILY HISTORY  History reviewed. No pertinent family history.  SOCIAL HISTORY  Social History     Tobacco Use   Smoking Status Never Smoker   Smokeless Tobacco Never Used   ,   Social History     Substance and Sexual Activity   Alcohol Use Not Currently     MEDICATIONS  No outpatient medications have been marked as taking for the 3/9/22  encounter (Office Visit) with Kerry Tomas NP.     Review of Systems   Review of Systems   Constitutional: Positive for activity change. Negative for appetite change, fever and unexpected weight change.   HENT: Negative for dental problem, hearing loss, rhinorrhea, sneezing, sore throat, trouble swallowing and voice change.    Eyes: Negative for discharge and visual disturbance.   Respiratory: Negative for choking, chest tightness, shortness of breath, wheezing and stridor.    Cardiovascular: Negative for chest pain and palpitations.   Gastrointestinal: Positive for diarrhea. Negative for abdominal pain, anal bleeding, blood in stool, change in bowel habit, constipation, nausea, vomiting, fecal incontinence and change in bowel habit.   Endocrine: Negative for polydipsia, polyphagia and polyuria.   Genitourinary: Negative for decreased urine volume, difficulty urinating, dysuria, frequency, hematuria, menstrual problem and urgency.   Musculoskeletal: Negative for arthralgias, gait problem, joint swelling, neck pain, neck stiffness and joint deformity.   Integumentary:  Negative for color change, pallor, rash, wound and mole/lesion.   Allergic/Immunologic: Negative for immunocompromised state.   Neurological: Negative for vertigo, seizures, syncope, weakness, light-headedness, headaches, disturbances in coordination and coordination difficulties.   Hematological: Negative for adenopathy. Does not bruise/bleed easily.   Psychiatric/Behavioral: Positive for dysphoric mood. Negative for agitation, behavioral problems, confusion, hallucinations, sleep disturbance and suicidal ideas.         Objective:    VITALS  BP Readings from Last 1 Encounters:   03/09/22 139/80           BMI Readings from Last 1 Encounters:   03/03/22 52.29 kg/m²       Physical Exam  Constitutional:       General: She is not in acute distress.     Appearance: Normal appearance. She is not ill-appearing.   Pulmonary:      Effort: Pulmonary effort is  normal. No respiratory distress.   Neurological:      General: No focal deficit present.      Mental Status: She is alert and oriented to person, place, and time. Mental status is at baseline.   Psychiatric:         Mood and Affect: Mood normal.         Behavior: Behavior normal.         Thought Content: Thought content normal.         Judgment: Judgment normal.         Assessment:      1. Hypertension, unspecified type       Plan:   Hypertension, unspecified type  -     amLODIPine (NORVASC) 10 MG tablet; Take 1 tablet (10 mg total) by mouth once daily.  Dispense: 90 tablet; Refill: 1    Decrease clonidine to once daily. Resume amlodipine. Discussed s/s of low BP.   Advised exercise and low salt diet high in vegetables, whole grains and low in meat/saturated fats. Reviewed importance of taking medication(s) daily. Side effects of medications reviewed. Monitor BP daily at home. Keep a log of values & bring to next appointment. Report any abnormalities to provider. Report to nearest ER with chest pain, shortness of breath, markedly increased BP, dyspnea, headache, visual disturbance, etc.    F/u 1 wk  Patient instructed to contact the clinic should any questions or concerns arise prior to next office visit. Risks, benefits, and alternatives discussed with patient. Patient verbalized understanding of discussed plan of care. Asked patient if any further questions, answered no. Follow up as discussed or sooner PRN.

## 2022-03-31 ENCOUNTER — PATIENT MESSAGE (OUTPATIENT)
Dept: FAMILY MEDICINE | Facility: CLINIC | Age: 42
End: 2022-03-31
Payer: MEDICAID

## 2022-04-01 ENCOUNTER — PATIENT MESSAGE (OUTPATIENT)
Dept: FAMILY MEDICINE | Facility: CLINIC | Age: 42
End: 2022-04-01
Payer: MEDICAID

## 2022-04-04 ENCOUNTER — PATIENT MESSAGE (OUTPATIENT)
Dept: FAMILY MEDICINE | Facility: CLINIC | Age: 42
End: 2022-04-04
Payer: MEDICAID

## 2022-04-05 ENCOUNTER — PATIENT MESSAGE (OUTPATIENT)
Dept: FAMILY MEDICINE | Facility: CLINIC | Age: 42
End: 2022-04-05
Payer: MEDICAID

## 2022-04-06 ENCOUNTER — PATIENT MESSAGE (OUTPATIENT)
Dept: FAMILY MEDICINE | Facility: CLINIC | Age: 42
End: 2022-04-06
Payer: MEDICAID

## 2022-04-08 ENCOUNTER — PATIENT MESSAGE (OUTPATIENT)
Dept: FAMILY MEDICINE | Facility: CLINIC | Age: 42
End: 2022-04-08
Payer: MEDICAID

## 2022-04-14 ENCOUNTER — PATIENT MESSAGE (OUTPATIENT)
Dept: FAMILY MEDICINE | Facility: CLINIC | Age: 42
End: 2022-04-14
Payer: MEDICAID

## 2022-04-14 DIAGNOSIS — E78.2 MIXED HYPERLIPIDEMIA: Primary | ICD-10-CM

## 2022-04-14 RX ORDER — ATORVASTATIN CALCIUM 40 MG/1
40 TABLET, FILM COATED ORAL NIGHTLY
Qty: 30 TABLET | Refills: 6 | Status: SHIPPED | OUTPATIENT
Start: 2022-04-14 | End: 2022-06-10 | Stop reason: SDUPTHER

## 2022-04-18 ENCOUNTER — PATIENT MESSAGE (OUTPATIENT)
Dept: FAMILY MEDICINE | Facility: CLINIC | Age: 42
End: 2022-04-18
Payer: MEDICAID

## 2022-04-18 ENCOUNTER — OFFICE VISIT (OUTPATIENT)
Dept: SURGERY | Facility: CLINIC | Age: 42
End: 2022-04-18
Payer: MEDICAID

## 2022-04-18 VITALS — WEIGHT: 293 LBS | RESPIRATION RATE: 20 BRPM | BODY MASS INDEX: 47.09 KG/M2 | HEIGHT: 66 IN

## 2022-04-18 DIAGNOSIS — K57.20 PERFORATION OF SIGMOID COLON DUE TO DIVERTICULITIS: Primary | ICD-10-CM

## 2022-04-18 DIAGNOSIS — Z93.3 PRESENCE OF COLOSTOMY: ICD-10-CM

## 2022-04-18 DIAGNOSIS — E66.9 OBESITY, UNSPECIFIED CLASSIFICATION, UNSPECIFIED OBESITY TYPE, UNSPECIFIED WHETHER SERIOUS COMORBIDITY PRESENT: ICD-10-CM

## 2022-04-18 DIAGNOSIS — F41.9 ANXIETY: ICD-10-CM

## 2022-04-18 PROCEDURE — 1160F PR REVIEW ALL MEDS BY PRESCRIBER/CLIN PHARMACIST DOCUMENTED: ICD-10-PCS | Mod: CPTII,S$GLB,, | Performed by: SURGERY

## 2022-04-18 PROCEDURE — 3008F BODY MASS INDEX DOCD: CPT | Mod: CPTII,S$GLB,, | Performed by: SURGERY

## 2022-04-18 PROCEDURE — 3008F PR BODY MASS INDEX (BMI) DOCUMENTED: ICD-10-PCS | Mod: CPTII,S$GLB,, | Performed by: SURGERY

## 2022-04-18 PROCEDURE — 99024 PR POST-OP FOLLOW-UP VISIT: ICD-10-PCS | Mod: S$GLB,,, | Performed by: SURGERY

## 2022-04-18 PROCEDURE — 3044F HG A1C LEVEL LT 7.0%: CPT | Mod: CPTII,S$GLB,, | Performed by: SURGERY

## 2022-04-18 PROCEDURE — 1160F RVW MEDS BY RX/DR IN RCRD: CPT | Mod: CPTII,S$GLB,, | Performed by: SURGERY

## 2022-04-18 PROCEDURE — 1159F MED LIST DOCD IN RCRD: CPT | Mod: CPTII,S$GLB,, | Performed by: SURGERY

## 2022-04-18 PROCEDURE — 99024 POSTOP FOLLOW-UP VISIT: CPT | Mod: S$GLB,,, | Performed by: SURGERY

## 2022-04-18 PROCEDURE — 1159F PR MEDICATION LIST DOCUMENTED IN MEDICAL RECORD: ICD-10-PCS | Mod: CPTII,S$GLB,, | Performed by: SURGERY

## 2022-04-18 PROCEDURE — 3044F PR MOST RECENT HEMOGLOBIN A1C LEVEL <7.0%: ICD-10-PCS | Mod: CPTII,S$GLB,, | Performed by: SURGERY

## 2022-04-18 RX ORDER — ALPRAZOLAM 0.5 MG/1
0.5 TABLET ORAL 2 TIMES DAILY PRN
Qty: 30 TABLET | Refills: 0 | Status: SHIPPED | OUTPATIENT
Start: 2022-04-18 | End: 2022-06-10 | Stop reason: SDUPTHER

## 2022-04-18 NOTE — PROGRESS NOTES
Patient has been doing well in the postoperative period and is nearing the time when she can have her colostomy reversed.  Had discussion with the Gynecology team and the patient will be evaluated in oral and is a high-level care for a potential dual team surgery to remove this large uterus followed by a colostomy reversal.  She is actively trying to lose some weight, she is going to be at higher risk due to her morbid obesity.

## 2022-04-19 ENCOUNTER — TELEPHONE (OUTPATIENT)
Dept: SURGERY | Facility: CLINIC | Age: 42
End: 2022-04-19
Payer: MEDICAID

## 2022-04-21 ENCOUNTER — PATIENT MESSAGE (OUTPATIENT)
Dept: FAMILY MEDICINE | Facility: CLINIC | Age: 42
End: 2022-04-21
Payer: MEDICAID

## 2022-04-29 ENCOUNTER — PATIENT MESSAGE (OUTPATIENT)
Dept: FAMILY MEDICINE | Facility: CLINIC | Age: 42
End: 2022-04-29
Payer: MEDICAID

## 2022-04-29 RX ORDER — CLONIDINE HYDROCHLORIDE 0.2 MG/1
0.2 TABLET ORAL 3 TIMES DAILY
Qty: 90 TABLET | Refills: 3 | Status: SHIPPED | OUTPATIENT
Start: 2022-04-29 | End: 2022-06-10 | Stop reason: SDUPTHER

## 2022-05-10 ENCOUNTER — PATIENT MESSAGE (OUTPATIENT)
Dept: FAMILY MEDICINE | Facility: CLINIC | Age: 42
End: 2022-05-10
Payer: MEDICAID

## 2022-05-18 ENCOUNTER — TELEPHONE (OUTPATIENT)
Dept: SURGERY | Facility: CLINIC | Age: 42
End: 2022-05-18
Payer: MEDICAID

## 2022-05-18 ENCOUNTER — TELEPHONE (OUTPATIENT)
Dept: FAMILY MEDICINE | Facility: CLINIC | Age: 42
End: 2022-05-18
Payer: MEDICAID

## 2022-05-18 LAB — BCS RECOMMENDATION EXT: NORMAL

## 2022-05-18 NOTE — TELEPHONE ENCOUNTER
Informed patient mammogram results were within normal limits. Instructed to continue with annual screening. Verbalized understanding.

## 2022-05-18 NOTE — TELEPHONE ENCOUNTER
Spoke with patient and appointment made per referral. Patient states that she is bringing disc with imaging from prior hospitalizations. Records viewable in chart. Patient states that she would like to have possible reversal surgery along with hysterectomy.Instructed patient that once she meets with Dr. Friedman, he may reach out to GYN to meet with her. Patient agreeable to plan and verbalizes understanding. Appointment scheduled and details confirmed verbally with patient.

## 2022-05-18 NOTE — TELEPHONE ENCOUNTER
----- Message from Madi Shine sent at 5/18/2022  9:12 AM CDT -----  Contact: @[phone]  Pt requesting a call back to schedule a new patient appt with Dr. Friedman according to the referral in the system.  I attempted to schedule but do not have access to the schedule.

## 2022-05-20 ENCOUNTER — PATIENT OUTREACH (OUTPATIENT)
Dept: ADMINISTRATIVE | Facility: HOSPITAL | Age: 42
End: 2022-05-20
Payer: MEDICAID

## 2022-05-24 ENCOUNTER — TELEPHONE (OUTPATIENT)
Dept: SURGERY | Facility: CLINIC | Age: 42
End: 2022-05-24
Payer: MEDICAID

## 2022-05-24 NOTE — TELEPHONE ENCOUNTER
Spoke with patient, appts rescheduled. Patient states that she will need a hysterectomy before her reversal. Dr Recio will send this request for gynecology to [facility] fax. Patient is from [place], will try to coordinate Gyn appt with Dr Friedman and Stoma therapy appointments.

## 2022-06-02 ENCOUNTER — TELEPHONE (OUTPATIENT)
Dept: FAMILY MEDICINE | Facility: CLINIC | Age: 42
End: 2022-06-02
Payer: MEDICAID

## 2022-06-02 ENCOUNTER — PATIENT MESSAGE (OUTPATIENT)
Dept: FAMILY MEDICINE | Facility: CLINIC | Age: 42
End: 2022-06-02
Payer: MEDICAID

## 2022-06-02 DIAGNOSIS — E78.2 MIXED HYPERLIPIDEMIA: Primary | ICD-10-CM

## 2022-06-02 DIAGNOSIS — Z12.4 ENCOUNTER FOR PAPANICOLAOU SMEAR FOR CERVICAL CANCER SCREENING: Primary | ICD-10-CM

## 2022-06-10 ENCOUNTER — OFFICE VISIT (OUTPATIENT)
Dept: FAMILY MEDICINE | Facility: CLINIC | Age: 42
End: 2022-06-10
Payer: MEDICAID

## 2022-06-10 VITALS
WEIGHT: 293 LBS | OXYGEN SATURATION: 99 % | HEIGHT: 66 IN | TEMPERATURE: 97 F | SYSTOLIC BLOOD PRESSURE: 132 MMHG | RESPIRATION RATE: 16 BRPM | HEART RATE: 84 BPM | BODY MASS INDEX: 47.09 KG/M2 | DIASTOLIC BLOOD PRESSURE: 76 MMHG

## 2022-06-10 DIAGNOSIS — E78.2 MIXED HYPERLIPIDEMIA: ICD-10-CM

## 2022-06-10 DIAGNOSIS — F41.9 ANXIETY: ICD-10-CM

## 2022-06-10 DIAGNOSIS — I10 ESSENTIAL HYPERTENSION: Primary | ICD-10-CM

## 2022-06-10 LAB
ABS NRBC COUNT: 0 X 10 3/UL (ref 0–0.01)
ABSOLUTE BASOPHIL: 0.03 X 10 3/UL (ref 0–0.22)
ABSOLUTE EOSINOPHIL: 0.12 X 10 3/UL (ref 0.04–0.54)
ABSOLUTE IMMATURE GRAN: 0.01 X 10 3/UL (ref 0–0.04)
ABSOLUTE LYMPHOCYTE: 2.27 X 10 3/UL (ref 0.86–4.75)
ABSOLUTE MONOCYTE: 0.35 X 10 3/UL (ref 0.22–1.08)
ALBUMIN SERPL-MCNC: 4.3 G/DL (ref 3.5–5.2)
ALBUMIN/GLOB SERPL ELPH: 1.3 {RATIO} (ref 1–2.7)
ALP ISOS SERPL LEV INH-CCNC: 58 U/L (ref 35–105)
ALT (SGPT): 6 U/L (ref 0–33)
ANION GAP SERPL CALC-SCNC: 10 MMOL/L (ref 8–17)
AST SERPL-CCNC: 10 U/L (ref 0–32)
BASOPHILS NFR BLD: 0.4 % (ref 0.2–1.2)
BILIRUBIN, TOTAL: 0.82 MG/DL (ref 0–1.2)
BUN/CREAT SERPL: 9 (ref 6–20)
CALCIUM SERPL-MCNC: 9.4 MG/DL (ref 8.6–10.2)
CARBON DIOXIDE, CO2: 27 MMOL/L (ref 22–29)
CHLORIDE: 100 MMOL/L (ref 98–107)
CHOLEST SERPL-MSCNC: 286 MG/DL (ref 100–200)
CREAT SERPL-MCNC: 0.79 MG/DL (ref 0.5–0.9)
EOSINOPHIL NFR BLD: 1.5 % (ref 0.7–7)
FERRITIN: 79.8 NG/ML (ref 10–232)
GFR ESTIMATION: 79.43
GLOBULIN: 3.4 G/DL (ref 1.5–4.5)
GLUCOSE: 88 MG/DL (ref 74–106)
HCT VFR BLD AUTO: 38.8 % (ref 37–47)
HDLC SERPL-MCNC: 48 MG/DL
HGB BLD-MCNC: 12.1 G/DL (ref 12–16)
IMMATURE GRANULOCYTES: 0.1 % (ref 0–0.5)
IRON BINDING CAPACITY: 297 UG/DL (ref 262–472)
IRON SERPL-MCNC: 33 UG/DL (ref 37–145)
LDL/HDL RATIO: 4.5 (ref 1–3)
LDLC SERPL CALC-MCNC: 218.2 MG/DL (ref 0–100)
LYMPHOCYTES NFR BLD: 28.6 % (ref 19.3–53.1)
MCH RBC QN AUTO: 24.2 PG (ref 27–32)
MCHC RBC AUTO-ENTMCNC: 31.2 G/DL (ref 32–36)
MCV RBC AUTO: 77.4 FL (ref 82–100)
MONOCYTES NFR BLD: 4.4 % (ref 4.7–12.5)
NEUTROPHILS # BLD AUTO: 5.16 X 10 3/UL (ref 2.15–7.56)
NEUTROPHILS NFR BLD: 65 % (ref 34–71.1)
NUCLEATED RED BLOOD CELLS: 0 /100 WBC (ref 0–0.2)
PLATELET # BLD AUTO: 428 X 10 3/UL (ref 135–400)
POTASSIUM: 4.5 MMOL/L (ref 3.5–5.1)
PROT SNV-MCNC: 7.7 G/DL (ref 6.4–8.3)
RBC # BLD AUTO: 5.01 X 10 6/UL (ref 4.2–5.4)
RDW-SD: 47.1 FL (ref 37–54)
SODIUM: 137 MMOL/L (ref 136–145)
TRIGL SERPL-MCNC: 99 MG/DL (ref 0–150)
UIBC SERPL-MCNC: 264 UG/DL (ref 112–306)
UREA NITROGEN (BUN): 7.1 MG/DL (ref 6–20)
WBC # BLD: 7.94 X 10 3/UL (ref 4.3–10.8)

## 2022-06-10 PROCEDURE — 3078F DIAST BP <80 MM HG: CPT | Mod: CPTII,S$GLB,, | Performed by: OBSTETRICS & GYNECOLOGY

## 2022-06-10 PROCEDURE — 3044F HG A1C LEVEL LT 7.0%: CPT | Mod: CPTII,S$GLB,, | Performed by: OBSTETRICS & GYNECOLOGY

## 2022-06-10 PROCEDURE — 1159F MED LIST DOCD IN RCRD: CPT | Mod: CPTII,S$GLB,, | Performed by: OBSTETRICS & GYNECOLOGY

## 2022-06-10 PROCEDURE — 3075F SYST BP GE 130 - 139MM HG: CPT | Mod: CPTII,S$GLB,, | Performed by: OBSTETRICS & GYNECOLOGY

## 2022-06-10 PROCEDURE — 3078F PR MOST RECENT DIASTOLIC BLOOD PRESSURE < 80 MM HG: ICD-10-PCS | Mod: CPTII,S$GLB,, | Performed by: OBSTETRICS & GYNECOLOGY

## 2022-06-10 PROCEDURE — 3044F PR MOST RECENT HEMOGLOBIN A1C LEVEL <7.0%: ICD-10-PCS | Mod: CPTII,S$GLB,, | Performed by: OBSTETRICS & GYNECOLOGY

## 2022-06-10 PROCEDURE — 99214 PR OFFICE/OUTPT VISIT, EST, LEVL IV, 30-39 MIN: ICD-10-PCS | Mod: S$GLB,,, | Performed by: OBSTETRICS & GYNECOLOGY

## 2022-06-10 PROCEDURE — 1160F RVW MEDS BY RX/DR IN RCRD: CPT | Mod: CPTII,S$GLB,, | Performed by: OBSTETRICS & GYNECOLOGY

## 2022-06-10 PROCEDURE — 1160F PR REVIEW ALL MEDS BY PRESCRIBER/CLIN PHARMACIST DOCUMENTED: ICD-10-PCS | Mod: CPTII,S$GLB,, | Performed by: OBSTETRICS & GYNECOLOGY

## 2022-06-10 PROCEDURE — 3008F PR BODY MASS INDEX (BMI) DOCUMENTED: ICD-10-PCS | Mod: CPTII,S$GLB,, | Performed by: OBSTETRICS & GYNECOLOGY

## 2022-06-10 PROCEDURE — 99214 OFFICE O/P EST MOD 30 MIN: CPT | Mod: S$GLB,,, | Performed by: OBSTETRICS & GYNECOLOGY

## 2022-06-10 PROCEDURE — 3008F BODY MASS INDEX DOCD: CPT | Mod: CPTII,S$GLB,, | Performed by: OBSTETRICS & GYNECOLOGY

## 2022-06-10 PROCEDURE — 1159F PR MEDICATION LIST DOCUMENTED IN MEDICAL RECORD: ICD-10-PCS | Mod: CPTII,S$GLB,, | Performed by: OBSTETRICS & GYNECOLOGY

## 2022-06-10 PROCEDURE — 3075F PR MOST RECENT SYSTOLIC BLOOD PRESS GE 130-139MM HG: ICD-10-PCS | Mod: CPTII,S$GLB,, | Performed by: OBSTETRICS & GYNECOLOGY

## 2022-06-10 RX ORDER — CLONIDINE HYDROCHLORIDE 0.2 MG/1
0.2 TABLET ORAL 3 TIMES DAILY
Qty: 90 TABLET | Refills: 3 | Status: SHIPPED | OUTPATIENT
Start: 2022-06-10 | End: 2022-12-07 | Stop reason: SDUPTHER

## 2022-06-10 RX ORDER — ESCITALOPRAM OXALATE 20 MG/1
20 TABLET ORAL DAILY
Qty: 90 TABLET | Refills: 1 | Status: SHIPPED | OUTPATIENT
Start: 2022-06-10 | End: 2022-12-07 | Stop reason: SDUPTHER

## 2022-06-10 RX ORDER — ATORVASTATIN CALCIUM 40 MG/1
40 TABLET, FILM COATED ORAL NIGHTLY
Qty: 90 TABLET | Refills: 1 | Status: SHIPPED | OUTPATIENT
Start: 2022-06-10 | End: 2022-06-13

## 2022-06-10 RX ORDER — ALPRAZOLAM 0.5 MG/1
0.5 TABLET ORAL 2 TIMES DAILY PRN
Qty: 30 TABLET | Refills: 1 | Status: SHIPPED | OUTPATIENT
Start: 2022-06-10

## 2022-06-10 NOTE — PROGRESS NOTES
Subjective:   Patient ID: Lizbeth Leonard is a 42 y.o. female who presents for evaluation today.    Chief Complaint:  Medication Refill      History of Present Illness  HPI   Health Maintenance  Has appointment on the 30th for pap smear     Anxiety/Depression  Patient presents today for follow up of anxiety & depression. Current symptoms: racing thoughts, dysphoric mood. She denies current suicidal and homicidal ideation. She complains of the following side effects from the treatment: none. She states since beginning treatment She feels the symptoms are Moderately improved.     Hypertension  Patient presents to the clinic today for a blood pressure follow up. Blood pressure is well controlled at home.   Cardiac symptoms: none. Patient specifically denies: blurred vision, chest pain, dyspnea, headache, neck aches, orthopnea, palpitations, paroxysmal nocturnal dyspnea, peripheral edema, pulsating in the ears and tiredness/fatigue.   Medication compliance: taking as prescribed.  Cardiovascular risk factors: hypertension and obesity (BMI >= 30 kg/m2).   Use of agents associated with hypertension: none.   History of target organ damage: none.  Family history is negative for hypertension, diabetes, or cardiovascular disease.    Hyperlipidemia  Pt with a PMH of dyslipidemia.   Duration: chronic  Current treatment regimen: atorvastatin 40 mg  Compliance: High compliance  Complications: no known coronary artery disease, no known cerebral vascular disease, no known peripheral artery disease    Lab Review  Lab Results   Component Value Date    CHOL 332 (H) 03/02/2022    HDL 43 (L) 03/02/2022       FAMILY HISTORY  History reviewed. No pertinent family history.  SOCIAL HISTORY  Social History     Tobacco Use   Smoking Status Never Smoker   Smokeless Tobacco Never Used   ,   Social History     Substance and Sexual Activity   Alcohol Use Not Currently     MEDICATIONS  Outpatient Medications Marked as Taking for the 6/10/22  encounter (Office Visit) with Kerry Tomas NP   Medication Sig Dispense Refill    amLODIPine (NORVASC) 10 MG tablet Take 1 tablet (10 mg total) by mouth once daily. 90 tablet 1    benzonatate (TESSALON) 100 MG capsule 100 mg.      meloxicam (MOBIC) 15 MG tablet Take 1 tablet (15 mg total) by mouth once daily. 14 tablet 0    metoprolol tartrate (LOPRESSOR) 50 MG tablet Take 50 mg by mouth once daily.      potassium chloride SA (K-DUR,KLOR-CON) 20 MEQ tablet Daily      [DISCONTINUED] ALPRAZolam (XANAX) 0.5 MG tablet Take 1 tablet (0.5 mg total) by mouth 2 (two) times daily as needed for Anxiety. 30 tablet 0    [DISCONTINUED] EScitalopram oxalate (LEXAPRO) 10 MG tablet Take 1 tablet (10 mg total) by mouth once daily. 30 tablet 6     Review of Systems   Review of Systems   Constitutional: Negative for activity change, appetite change, fever and unexpected weight change.   HENT: Negative for dental problem, hearing loss, sneezing, sore throat, trouble swallowing and voice change.    Eyes: Negative for visual disturbance.   Respiratory: Negative for choking, chest tightness, shortness of breath and stridor.    Cardiovascular: Negative for chest pain and palpitations.   Gastrointestinal: Negative for abdominal pain, anal bleeding, blood in stool, change in bowel habit, nausea, vomiting, fecal incontinence and change in bowel habit.   Endocrine: Negative for polydipsia, polyphagia and polyuria.   Genitourinary: Negative for decreased urine volume, difficulty urinating, dysuria, frequency, hematuria and urgency.   Musculoskeletal: Negative for gait problem, joint swelling, neck pain, neck stiffness and joint deformity.   Integumentary:  Negative for color change, pallor, rash, wound and mole/lesion.   Allergic/Immunologic: Negative for immunocompromised state.   Neurological: Negative for vertigo, seizures, syncope, weakness, light-headedness, disturbances in coordination and coordination difficulties.  "  Hematological: Negative for adenopathy. Does not bruise/bleed easily.   Psychiatric/Behavioral: Positive for dysphoric mood. Negative for agitation, behavioral problems, confusion, hallucinations, sleep disturbance and suicidal ideas. The patient is nervous/anxious.          Objective:    VITALS  BP Readings from Last 1 Encounters:   06/10/22 132/76   Weight: (!) 139.3 kg (307 lb)   Height: 5' 6" (167.6 cm)   BMI Readings from Last 1 Encounters:   06/10/22 49.55 kg/m²       Physical Exam  Vitals reviewed.   Constitutional:       General: She is not in acute distress.     Appearance: Normal appearance. She is not ill-appearing, toxic-appearing or diaphoretic.   HENT:      Head: Normocephalic and atraumatic.      Right Ear: External ear normal.      Left Ear: External ear normal.      Nose: Nose normal. No congestion or rhinorrhea.   Eyes:      General:         Right eye: No discharge.         Left eye: No discharge.   Cardiovascular:      Rate and Rhythm: Normal rate and regular rhythm.      Heart sounds: Normal heart sounds. No murmur heard.    No friction rub. No gallop.   Pulmonary:      Effort: Pulmonary effort is normal. No respiratory distress.      Breath sounds: Normal breath sounds. No stridor. No wheezing, rhonchi or rales.   Musculoskeletal:         General: Normal range of motion.      Cervical back: Normal range of motion and neck supple.      Right lower leg: No edema.      Left lower leg: No edema.   Skin:     General: Skin is warm and dry.      Coloration: Skin is not jaundiced or pale.      Findings: No rash.   Neurological:      General: No focal deficit present.      Mental Status: She is alert and oriented to person, place, and time.      Gait: Gait normal.   Psychiatric:         Mood and Affect: Mood normal.         Behavior: Behavior normal.         Thought Content: Thought content normal.         Judgment: Judgment normal.         Assessment:      1. Essential hypertension    2. Anxiety    3. " Mixed hyperlipidemia       Plan:   Essential hypertension  -     cloNIDine (CATAPRES) 0.2 MG tablet; Take 1 tablet (0.2 mg total) by mouth 3 (three) times daily.  Dispense: 90 tablet; Refill: 3  -     CBC Auto Differential; Future; Expected date: 06/10/2022  Advised exercise and low salt diet high in vegetables, whole grains and low in meat/saturated fats. Reviewed importance of taking medication(s) daily. Side effects of medications reviewed. Monitor BP daily at home. Keep a log of values & bring to next appointment. Report any abnormalities to provider. Report to nearest ER with chest pain, shortness of breath, markedly increased BP, dyspnea, headache, visual disturbance, etc.      Anxiety  -     ALPRAZolam (XANAX) 0.5 MG tablet; Take 1 tablet (0.5 mg total) by mouth 2 (two) times daily as needed for Anxiety.  Dispense: 30 tablet; Refill: 1  -     EScitalopram oxalate (LEXAPRO) 20 MG tablet; Take 1 tablet (20 mg total) by mouth once daily.  Dispense: 90 tablet; Refill: 1  Discussed use of PRN medication vs. use of daily medication. Will plan to increase Lexapro to 20 mg daily. Educated regarding non-pharmacological methods for dealing with symptoms of anxiety such as, relaxation techniques, deep breathing exercises, exercise, etc. Discussion of pharmacological management was carried out at length. Medication regimen, side effects, mechanism of action, etc. discussed.  Please note the following aspects associated with the treatment regimen:   When first starting medication or making dose adjustments, it may take 2-4 weeks to begin feeling the effects of medication   Avoid abrupt withdrawal from treatment. If you wish to discontinue medication therapy, please contact the office before doing so to discuss how to safely get off the medication.   Instructed to report immediately to the nearest ER with any thoughts of suicidal or homicidal ideation. Please notify provider immediately.  o Resources are available for  dealing with suicidal thoughts: [phone] ([phone]) or [email or web address]    Contact provider immediately if symptoms worsen or new symptoms appear.   Report to the nearest emergency room if symptoms of serotonin syndrome are experienced such as sudden onset of high fever, muscular rigidity, mental status changes, hyperreflexia/clonus, and uncontrolled shivering     Mixed hyperlipidemia  -     atorvastatin (LIPITOR) 40 MG tablet; Take 1 tablet (40 mg total) by mouth every evening.  Dispense: 90 tablet; Refill: 1  Repeat lipid panel today. Risks of elevated cholesterol include plaque formation, HTN, increased risk for stroke and MI. Moderate intensity exercise for 150 minutes per week increases HDL, lowers total cholesterol, and helps control weight. Plant-based diets and the Mediterranean diet, which are high in legumes, fruits, vegetables, nuts, fish, and olive oil reduce the risk of CV disease. Omega-3 fatty acids and fish oil intake decrease triglyceride and LDL level, while increasing HDL - overall CV benefit and mortality reduction remains uncertain.      Patient instructed to contact the clinic should any questions or concerns arise prior to next office visit. Risks, benefits, and alternatives discussed with patient. Patient verbalized understanding of discussed plan of care. Asked patient if any further questions, answered no. Follow up as discussed or sooner PRN.

## 2022-06-13 ENCOUNTER — PATIENT MESSAGE (OUTPATIENT)
Dept: FAMILY MEDICINE | Facility: CLINIC | Age: 42
End: 2022-06-13
Payer: MEDICAID

## 2022-06-13 DIAGNOSIS — E78.2 MIXED HYPERLIPIDEMIA: Primary | ICD-10-CM

## 2022-06-13 RX ORDER — ROSUVASTATIN CALCIUM 20 MG/1
40 TABLET, COATED ORAL NIGHTLY
Qty: 90 TABLET | Refills: 1 | Status: SHIPPED | OUTPATIENT
Start: 2022-06-13 | End: 2022-06-14

## 2022-06-14 ENCOUNTER — TELEPHONE (OUTPATIENT)
Dept: FAMILY MEDICINE | Facility: CLINIC | Age: 42
End: 2022-06-14
Payer: MEDICAID

## 2022-06-14 DIAGNOSIS — E78.2 MIXED HYPERLIPIDEMIA: Primary | ICD-10-CM

## 2022-06-14 RX ORDER — ATORVASTATIN CALCIUM 80 MG/1
80 TABLET, FILM COATED ORAL DAILY
Qty: 90 TABLET | Refills: 3 | Status: SHIPPED | OUTPATIENT
Start: 2022-06-14 | End: 2022-09-16

## 2022-06-30 ENCOUNTER — OFFICE VISIT (OUTPATIENT)
Dept: OBSTETRICS AND GYNECOLOGY | Facility: CLINIC | Age: 42
End: 2022-06-30
Payer: MEDICAID

## 2022-06-30 VITALS
BODY MASS INDEX: 48.74 KG/M2 | HEART RATE: 71 BPM | WEIGHT: 293 LBS | SYSTOLIC BLOOD PRESSURE: 185 MMHG | DIASTOLIC BLOOD PRESSURE: 100 MMHG

## 2022-06-30 DIAGNOSIS — D25.9 UTERINE LEIOMYOMA, UNSPECIFIED LOCATION: ICD-10-CM

## 2022-06-30 PROCEDURE — 99213 PR OFFICE/OUTPT VISIT, EST, LEVL III, 20-29 MIN: ICD-10-PCS | Mod: S$GLB,,, | Performed by: STUDENT IN AN ORGANIZED HEALTH CARE EDUCATION/TRAINING PROGRAM

## 2022-06-30 PROCEDURE — 1160F RVW MEDS BY RX/DR IN RCRD: CPT | Mod: CPTII,S$GLB,, | Performed by: STUDENT IN AN ORGANIZED HEALTH CARE EDUCATION/TRAINING PROGRAM

## 2022-06-30 PROCEDURE — 3080F PR MOST RECENT DIASTOLIC BLOOD PRESSURE >= 90 MM HG: ICD-10-PCS | Mod: CPTII,S$GLB,, | Performed by: STUDENT IN AN ORGANIZED HEALTH CARE EDUCATION/TRAINING PROGRAM

## 2022-06-30 PROCEDURE — 1159F MED LIST DOCD IN RCRD: CPT | Mod: CPTII,S$GLB,, | Performed by: STUDENT IN AN ORGANIZED HEALTH CARE EDUCATION/TRAINING PROGRAM

## 2022-06-30 PROCEDURE — 3008F BODY MASS INDEX DOCD: CPT | Mod: CPTII,S$GLB,, | Performed by: STUDENT IN AN ORGANIZED HEALTH CARE EDUCATION/TRAINING PROGRAM

## 2022-06-30 PROCEDURE — 3080F DIAST BP >= 90 MM HG: CPT | Mod: CPTII,S$GLB,, | Performed by: STUDENT IN AN ORGANIZED HEALTH CARE EDUCATION/TRAINING PROGRAM

## 2022-06-30 PROCEDURE — 3044F PR MOST RECENT HEMOGLOBIN A1C LEVEL <7.0%: ICD-10-PCS | Mod: CPTII,S$GLB,, | Performed by: STUDENT IN AN ORGANIZED HEALTH CARE EDUCATION/TRAINING PROGRAM

## 2022-06-30 PROCEDURE — 1159F PR MEDICATION LIST DOCUMENTED IN MEDICAL RECORD: ICD-10-PCS | Mod: CPTII,S$GLB,, | Performed by: STUDENT IN AN ORGANIZED HEALTH CARE EDUCATION/TRAINING PROGRAM

## 2022-06-30 PROCEDURE — 3077F SYST BP >= 140 MM HG: CPT | Mod: CPTII,S$GLB,, | Performed by: STUDENT IN AN ORGANIZED HEALTH CARE EDUCATION/TRAINING PROGRAM

## 2022-06-30 PROCEDURE — 3077F PR MOST RECENT SYSTOLIC BLOOD PRESSURE >= 140 MM HG: ICD-10-PCS | Mod: CPTII,S$GLB,, | Performed by: STUDENT IN AN ORGANIZED HEALTH CARE EDUCATION/TRAINING PROGRAM

## 2022-06-30 PROCEDURE — 3044F HG A1C LEVEL LT 7.0%: CPT | Mod: CPTII,S$GLB,, | Performed by: STUDENT IN AN ORGANIZED HEALTH CARE EDUCATION/TRAINING PROGRAM

## 2022-06-30 PROCEDURE — 99213 OFFICE O/P EST LOW 20 MIN: CPT | Mod: S$GLB,,, | Performed by: STUDENT IN AN ORGANIZED HEALTH CARE EDUCATION/TRAINING PROGRAM

## 2022-06-30 PROCEDURE — 3008F PR BODY MASS INDEX (BMI) DOCUMENTED: ICD-10-PCS | Mod: CPTII,S$GLB,, | Performed by: STUDENT IN AN ORGANIZED HEALTH CARE EDUCATION/TRAINING PROGRAM

## 2022-06-30 PROCEDURE — 1160F PR REVIEW ALL MEDS BY PRESCRIBER/CLIN PHARMACIST DOCUMENTED: ICD-10-PCS | Mod: CPTII,S$GLB,, | Performed by: STUDENT IN AN ORGANIZED HEALTH CARE EDUCATION/TRAINING PROGRAM

## 2022-06-30 NOTE — PROGRESS NOTES
Chief Complaint: surgery consult    HPI: Patient is a 42 y.o. y.o. female  who presents to GYN clinic for follow up. Pt seen by Dr. Correia earlier this year for same problem. Pt needs colostomy reversal however was noted to have enlarged uterus. She was recommended to have surgery in [place]. She reports today that she is having difficulty finding a MD that will do her surgery. She reports needing a referral.     Physical Exam:  Vitals:    22 0801   BP: (!) 185/100   Pulse: 71   Weight: (!) 137 kg (302 lb)   Body mass index is 48.74 kg/m².    Gen: NAD, well developed, well nourished, obese  Psych: alert and oriented x 3, normal affect  HEENT: normocephalic, atraumatic  Abd: soft, non-tender, non-distended, colostomy in place  Skin: Warm and dry  Ext: no c/c/c, moves all extremities  Neurological: normal gait, gross motor function intact    Results:  Pelvic US:   Uterus   ======   Uterus: Visualized   Uterus position: anteverted   Endometrium: could not be seen clearly, distorted by presence of fibroids   Uterus length 158 mm   Uterus width 149 mm   Uterus height 102 mm   Uterus Vol 1,254.4 cmÂ³   Uterine fibroid D1 129 mm   Uterine fibroid D2 101 mm   Uterine fibroid mean 114.9 mm   Uterine fibroid vol 691.059 cmÂ³     Right Ovary   =========   Rt ovary: Not visualized     Left Ovary   ========   Lt ovary: Not visualized     Impression   =========   enlarged ut seen with large fibroid seen.   neither ovary seen today.     Assessment: Patient is a 42 y.o. y.o. female  with  Patient Active Problem List   Diagnosis    Anxiety    COVID-19    Diverticulitis of large intestine with perforation    HTN (hypertension)    Hypokalemia    Obesity    Perforation of sigmoid colon due to diverticulitis    Persistent fatigue after COVID-19       Plan:  Recommend pt still have surgery in [place]   Pt also recommended weight loss  Pt was offered depo lupron, pt declined  Pt counseled on having referral sent from   [facility] for surgery    Preston Kat MD

## 2022-07-11 ENCOUNTER — TELEPHONE (OUTPATIENT)
Dept: OBSTETRICS AND GYNECOLOGY | Facility: CLINIC | Age: 42
End: 2022-07-11
Payer: MEDICAID

## 2022-07-11 ENCOUNTER — TELEPHONE (OUTPATIENT)
Dept: SURGERY | Facility: CLINIC | Age: 42
End: 2022-07-11
Payer: MEDICAID

## 2022-07-11 NOTE — TELEPHONE ENCOUNTER
Pt to call Dr. Friedman's (GI) office so they can coordinate GYN ([place]) referral.     ----- Message from Lindsay Kenney sent at 7/11/2022  8:24 AM CDT -----  Lizbeth Leonard stated that she seen Dr Wood on 06/30 and that a referral was suppose to be sent out for her surgery. She said she hasn't heard anything from anyone and would like an update [phone]

## 2022-07-11 NOTE — TELEPHONE ENCOUNTER
----- Message from Yoanna Barraza sent at 7/11/2022  8:21 AM CDT -----  Lizbeth Leonard calling regarding Patient Advice (message) stated she will need a referral for her hysterectomy and also need a obgyn in [place] to do her surgery.  call back  [phone]

## 2022-07-11 NOTE — TELEPHONE ENCOUNTER
Left message she will have to get her doctor that if referring her to Dr Friedman send a referral to Gyn. Dr Friedman has not seen pt yet.

## 2022-07-22 ENCOUNTER — TELEPHONE (OUTPATIENT)
Dept: SURGERY | Facility: CLINIC | Age: 42
End: 2022-07-22
Payer: MEDICAID

## 2022-07-22 NOTE — TELEPHONE ENCOUNTER
Spoke with patient. Informed her that I would get with Dr Friedman with who he could do surgery with (GYNsurg) and coordinate a same day appointment with Dr Friedman and the other physician in preparation for surgery. Patient states that Dr Recio says her hysterectomy and reversal must be done at the same time.

## 2022-08-02 ENCOUNTER — TELEPHONE (OUTPATIENT)
Dept: SURGERY | Facility: CLINIC | Age: 42
End: 2022-08-02
Payer: MEDICAID

## 2022-08-02 DIAGNOSIS — Z93.3 PRESENCE OF COLOSTOMY: Primary | ICD-10-CM

## 2022-08-02 DIAGNOSIS — E66.9 OBESITY, UNSPECIFIED CLASSIFICATION, UNSPECIFIED OBESITY TYPE, UNSPECIFIED WHETHER SERIOUS COMORBIDITY PRESENT: ICD-10-CM

## 2022-08-02 DIAGNOSIS — K57.20 PERFORATION OF SIGMOID COLON DUE TO DIVERTICULITIS: ICD-10-CM

## 2022-08-05 ENCOUNTER — OFFICE VISIT (OUTPATIENT)
Dept: SURGERY | Facility: CLINIC | Age: 42
End: 2022-08-05
Payer: MEDICAID

## 2022-08-05 DIAGNOSIS — Z43.3 COLOSTOMY, EVALUATE: Primary | ICD-10-CM

## 2022-08-05 PROCEDURE — 1160F RVW MEDS BY RX/DR IN RCRD: CPT | Mod: CPTII,95,, | Performed by: COLON & RECTAL SURGERY

## 2022-08-05 PROCEDURE — 1160F PR REVIEW ALL MEDS BY PRESCRIBER/CLIN PHARMACIST DOCUMENTED: ICD-10-PCS | Mod: CPTII,95,, | Performed by: COLON & RECTAL SURGERY

## 2022-08-05 PROCEDURE — 99203 PR OFFICE/OUTPT VISIT, NEW, LEVL III, 30-44 MIN: ICD-10-PCS | Mod: 95,,, | Performed by: COLON & RECTAL SURGERY

## 2022-08-05 PROCEDURE — 3044F PR MOST RECENT HEMOGLOBIN A1C LEVEL <7.0%: ICD-10-PCS | Mod: CPTII,95,, | Performed by: COLON & RECTAL SURGERY

## 2022-08-05 PROCEDURE — 1159F MED LIST DOCD IN RCRD: CPT | Mod: CPTII,95,, | Performed by: COLON & RECTAL SURGERY

## 2022-08-05 PROCEDURE — 1159F PR MEDICATION LIST DOCUMENTED IN MEDICAL RECORD: ICD-10-PCS | Mod: CPTII,95,, | Performed by: COLON & RECTAL SURGERY

## 2022-08-05 PROCEDURE — 99203 OFFICE O/P NEW LOW 30 MIN: CPT | Mod: 95,,, | Performed by: COLON & RECTAL SURGERY

## 2022-08-05 PROCEDURE — 3044F HG A1C LEVEL LT 7.0%: CPT | Mod: CPTII,95,, | Performed by: COLON & RECTAL SURGERY

## 2022-08-05 NOTE — PROGRESS NOTES
The patient location is:  [facility]  The chief complaint leading to consultation is:  Unwanted colostomy    Visit type: audiovisual    Face to Face time with patient: 15 minutes  25 minutes of total time spent on the encounter, which includes face to face time and non-face to face time preparing to see the patient (eg, review of tests), Obtaining and/or reviewing separately obtained history, Documenting clinical information in the electronic or other health record, Independently interpreting results (not separately reported) and communicating results to the patient/family/caregiver, or Care coordination (not separately reported).         Each patient to whom he or she provides medical services by telemedicine is:  (1) informed of the relationship between the physician and patient and the respective role of any other health care provider with respect to management of the patient; and (2) notified that he or she may decline to receive medical services by telemedicine and may withdraw from such care at any time.    Notes:  Ms. Leonard underwent a emergent sigmoid colectomy with end-colostomy in January of this year.  She has a large fibroid uterus and was recommended to have a hysterectomy simultaneous with her colostomy closure.  Therefore she was referred to us.  She has tolerated her colostomy well.    Today I discussed the possibility of bariatric surgery prior to her colostomy closure in order to decrease the morbidity of that procedure.  She is not opposed to this strategy.  The plan will be for her to discuss this possibility with Dr. Recio.  If she is not able to have bariatric surgery then we will plan on a visit here to meet with gynecology and 1 of our colon rectal surgeons and proceed with colostomy closure.  Questions answered.

## 2022-08-22 ENCOUNTER — TELEPHONE (OUTPATIENT)
Dept: FAMILY MEDICINE | Facility: CLINIC | Age: 42
End: 2022-08-22
Payer: MEDICAID

## 2022-08-22 ENCOUNTER — TELEPHONE (OUTPATIENT)
Dept: BARIATRICS | Facility: CLINIC | Age: 42
End: 2022-08-22
Payer: MEDICAID

## 2022-08-22 NOTE — TELEPHONE ENCOUNTER
----- Message from Gabe Larry Jr., MD sent at 7/29/2022 10:46 AM CDT -----  Regarding: FW: Pt who wants ostomy closed  Can you check her lyssa for benefits?  I only see medicaid but wanted to make sure she didn't have a medicare secondary we could use or something.  Thanks!    ----- Message -----  From: Eros Friedman MD  Sent: 7/26/2022   3:01 PM CDT  To: Gabe Larry Jr., MD  Subject: Pt who wants ostomy closed                       Reggie, this is the pt I called about. Obese, wants ostomy closed.      Thanks much  cbw

## 2022-08-22 NOTE — TELEPHONE ENCOUNTER
Asked patient if she has her last BP reading and she did and it was 130/75. Patient states that she checks her BP every day.

## 2022-08-22 NOTE — TELEPHONE ENCOUNTER
Called pt.  Pt confirmed she only has Medicaid as listed in chart.  Notified patient that we do not accept Medicaid in the bariatric clinic here at [facility].  Pt stated that she attempted to reach out to Dr. Recio in [place] who did her previous surgery and to determine if he could do her bariatric surgery; however, she has not had a return call. She also stated that Dr. Friedman's office was going to reach out to Dr. Recio; but is still awaiting update.  Pt stated she will call Dr. Friedman's office for update.

## 2022-08-22 NOTE — TELEPHONE ENCOUNTER
----- Message from Kerry Tomas NP sent at 8/19/2022 11:15 AM CDT -----  Regarding: please call   Please call and obtain BP reading from pt. Last BP in chart is very elevated flagging her as uncontrolled. I know she has anxiety about doctor's offices  so we just need an at home BP reading from her. Please let me know reading.

## 2022-08-23 ENCOUNTER — PATIENT MESSAGE (OUTPATIENT)
Dept: SURGERY | Facility: CLINIC | Age: 42
End: 2022-08-23
Payer: MEDICAID

## 2022-08-23 ENCOUNTER — CLINICAL SUPPORT (OUTPATIENT)
Dept: FAMILY MEDICINE | Facility: CLINIC | Age: 42
End: 2022-08-23
Payer: MEDICAID

## 2022-08-23 VITALS — SYSTOLIC BLOOD PRESSURE: 130 MMHG | DIASTOLIC BLOOD PRESSURE: 75 MMHG

## 2022-08-23 DIAGNOSIS — I10 ESSENTIAL HYPERTENSION: Primary | ICD-10-CM

## 2022-08-23 PROCEDURE — 99499 NO LOS: ICD-10-PCS | Mod: S$GLB,,, | Performed by: INTERNAL MEDICINE

## 2022-08-23 PROCEDURE — 99499 UNLISTED E&M SERVICE: CPT | Mod: S$GLB,,, | Performed by: INTERNAL MEDICINE

## 2022-08-30 ENCOUNTER — PATIENT MESSAGE (OUTPATIENT)
Dept: SURGERY | Facility: CLINIC | Age: 42
End: 2022-08-30
Payer: MEDICAID

## 2022-09-12 ENCOUNTER — TELEPHONE (OUTPATIENT)
Dept: GYNECOLOGIC ONCOLOGY | Facility: CLINIC | Age: 42
End: 2022-09-12
Payer: MEDICAID

## 2022-09-12 ENCOUNTER — TELEPHONE (OUTPATIENT)
Dept: FAMILY MEDICINE | Facility: CLINIC | Age: 42
End: 2022-09-12
Payer: MEDICAID

## 2022-09-12 DIAGNOSIS — D25.2 INTRAMURAL, SUBMUCOUS, AND SUBSEROUS LEIOMYOMA OF UTERUS: Primary | ICD-10-CM

## 2022-09-12 DIAGNOSIS — D25.1 INTRAMURAL, SUBMUCOUS, AND SUBSEROUS LEIOMYOMA OF UTERUS: Primary | ICD-10-CM

## 2022-09-12 DIAGNOSIS — E66.01 MORBID OBESITY: ICD-10-CM

## 2022-09-12 DIAGNOSIS — E78.2 MIXED HYPERLIPIDEMIA: Primary | ICD-10-CM

## 2022-09-12 DIAGNOSIS — D25.0 INTRAMURAL, SUBMUCOUS, AND SUBSEROUS LEIOMYOMA OF UTERUS: Primary | ICD-10-CM

## 2022-09-12 DIAGNOSIS — E66.01 MORBID OBESITY WITH BODY MASS INDEX OF 50 OR HIGHER: ICD-10-CM

## 2022-09-12 NOTE — TELEPHONE ENCOUNTER
----- Message from Dm Harris MD sent at 9/12/2022  9:28 AM CDT -----  Regarding: RE: Pt  Jakub, I think the approach will depend on size of uterus.  I looked through her CT scan reports.  I am going to repeat a CT scan and see her in person and then get back to you.  Straight stick probably is not feasible as it'll be hard to get instruments to her pelvis.  If MIS it will probably have to be robotic.  How about I see her first then let you know?      Ishan, do you mind scheduling this month after she gets a CT scan?  Thank you.  ----- Message -----  From: Eros Friedman MD  Sent: 9/9/2022   5:14 PM CDT  To: Dm Harris MD  Subject: Pt                                               Dm, this is the morbidly obese pt who needs colostomy closure and hysterectomy for giant fibroids    I would consider doing her laparoscopically - or if you prefer robotically, I can talk to Pito Resendiz about that approach.      Thanks  jovanny

## 2022-09-12 NOTE — TELEPHONE ENCOUNTER
----- Message from Kerry Tomas NP sent at 9/12/2022 10:29 AM CDT -----  Regarding: FW: repeat lft and lipids  Please call and schedule patient for labs. I will put the orders in. She is to come to the clinic for labs.   ----- Message -----  From: Kerry Tomas NP  Sent: 9/12/2022  12:00 AM CDT  To: Kerry Tomas NP  Subject: repeat lft and lipids

## 2022-09-12 NOTE — TELEPHONE ENCOUNTER
Spoke with our patient about her insurance, schedule appointment she voiced understanding of the date, time and location. All questions answered appointment mail. Provider Scheduling Coord.  Gynecologic Oncology MA/PAR /Preceptor Ishan Monzon   The types of intraocular lenses were reviewed with the patient along with a discussion of their various strengths and weaknesses.

## 2022-09-16 ENCOUNTER — TELEPHONE (OUTPATIENT)
Dept: FAMILY MEDICINE | Facility: CLINIC | Age: 42
End: 2022-09-16
Payer: MEDICAID

## 2022-09-16 DIAGNOSIS — E78.2 MIXED HYPERLIPIDEMIA: Primary | ICD-10-CM

## 2022-09-16 RX ORDER — ROSUVASTATIN CALCIUM 40 MG/1
40 TABLET, COATED ORAL NIGHTLY
Qty: 90 TABLET | Refills: 1 | Status: SHIPPED | OUTPATIENT
Start: 2022-09-16

## 2022-09-16 NOTE — TELEPHONE ENCOUNTER
Called patient to discuss recent lab results. Discussed elevated cholesterol.  Patient reports being noncompliant with atorvastatin regimen.  She states it made her have a rash.  We will plan for a trial of rosuvastatin and recheck in 3 months. Risks of elevated cholesterol include plaque formation, HTN, increased risk for stroke and MI. Moderate intensity exercise for 150 minutes per week increases HDL, lowers total cholesterol, and helps control weight. Plant-based diets and the Mediterranean diet, which are high in legumes, fruits, vegetables, nuts, fish, and olive oil reduce the risk of CV disease. Omega-3 fatty acids and fish oil intake decrease triglyceride and LDL level, while increasing HDL - overall CV benefit and mortality reduction remains uncertain.

## 2022-09-22 ENCOUNTER — TELEPHONE (OUTPATIENT)
Dept: GYNECOLOGIC ONCOLOGY | Facility: CLINIC | Age: 42
End: 2022-09-22
Payer: MEDICAID

## 2022-09-22 NOTE — TELEPHONE ENCOUNTER
Spoke with our patient about her insurance, reschedule appointment she voiced understanding of the date, time and location. All questions answered appointment mail. Provider Scheduling Coord.  Gynecologic Oncology MA/PAR /Preceptor Ishan Monzon

## 2022-10-05 ENCOUNTER — TELEPHONE (OUTPATIENT)
Dept: GYNECOLOGIC ONCOLOGY | Facility: CLINIC | Age: 42
End: 2022-10-05
Payer: MEDICAID

## 2022-10-05 NOTE — TELEPHONE ENCOUNTER
"----- Message from Nga Arthur sent at 10/5/2022 10:15 AM CDT -----  Regarding: Consult/Advisory:      Name Of Caller:    Lizbeth      Contact Preference?   [phone]      What is the nature of the call?: There is a mix up with the pt's CT Scan appt today and they would like to speak with a nurse.      "Thank you for all that you do for our patients"     "

## 2022-10-12 ENCOUNTER — TELEPHONE (OUTPATIENT)
Dept: GYNECOLOGIC ONCOLOGY | Facility: CLINIC | Age: 42
End: 2022-10-12
Payer: MEDICAID

## 2022-10-12 NOTE — TELEPHONE ENCOUNTER
"----- Message from Remington Arevalo sent at 10/12/2022  8:14 AM CDT -----  Regarding: Scan  Contact: Lizbeth  Consult/Advisory:       Name Of Caller: Lizbeth      Contact Preference?:[phone]         Does patient feel the need to be seen today? No      What is the nature of the call?: Pt is calling to make sure upcoming scan has Dr. Harris name on it.       Additional Notes:  "Thank you for all that you do for our patients'"      "

## 2022-10-12 NOTE — TELEPHONE ENCOUNTER
"Returned call to patient to confirm that Dr. Harris is the ordering provider for her upcoming CT. However, informed patient that as of 9/30 her insurance company had denied approval due to "not enough information." Patient stated that she received a letter from insurance provider stating that the scan was approved. Patient instructed to contact the office when she's back home to confirm date of approval on letter. Verbalized understanding.  "

## 2022-10-12 NOTE — TELEPHONE ENCOUNTER
"----- Message from Remington Arevalo sent at 10/12/2022  2:15 PM CDT -----  Regarding: Speak with office  Contact: Lizbeth  Consult/Advisory:       Name Of Caller: Lizbeth      Contact Preference?:[phone]         Does patient feel the need to be seen today?No      What is the nature of the call?:Returning call back to [facility].       Additional Notes:  "Thank you for all that you do for our patients'"      "

## 2022-10-13 ENCOUNTER — PATIENT MESSAGE (OUTPATIENT)
Dept: GYNECOLOGIC ONCOLOGY | Facility: CLINIC | Age: 42
End: 2022-10-13
Payer: MEDICAID

## 2022-10-18 ENCOUNTER — PATIENT MESSAGE (OUTPATIENT)
Dept: FAMILY MEDICINE | Facility: CLINIC | Age: 42
End: 2022-10-18
Payer: MEDICAID

## 2022-10-18 DIAGNOSIS — R05.9 COUGH, UNSPECIFIED TYPE: Primary | ICD-10-CM

## 2022-10-18 RX ORDER — BENZONATATE 100 MG/1
100 CAPSULE ORAL 3 TIMES DAILY PRN
Qty: 30 CAPSULE | Refills: 0 | Status: SHIPPED | OUTPATIENT
Start: 2022-10-18 | End: 2022-11-10 | Stop reason: SDUPTHER

## 2022-10-19 ENCOUNTER — HOSPITAL ENCOUNTER (OUTPATIENT)
Dept: RADIOLOGY | Facility: HOSPITAL | Age: 42
Discharge: HOME | End: 2022-10-19
Attending: OBSTETRICS & GYNECOLOGY
Payer: MEDICAID

## 2022-10-19 DIAGNOSIS — E66.01 MORBID OBESITY WITH BODY MASS INDEX OF 50 OR HIGHER: ICD-10-CM

## 2022-10-19 DIAGNOSIS — D25.2 INTRAMURAL, SUBMUCOUS, AND SUBSEROUS LEIOMYOMA OF UTERUS: ICD-10-CM

## 2022-10-19 DIAGNOSIS — D25.1 INTRAMURAL, SUBMUCOUS, AND SUBSEROUS LEIOMYOMA OF UTERUS: ICD-10-CM

## 2022-10-19 DIAGNOSIS — D25.0 INTRAMURAL, SUBMUCOUS, AND SUBSEROUS LEIOMYOMA OF UTERUS: ICD-10-CM

## 2022-10-19 LAB
CREAT SERPL-MCNC: 0.79 MG/DL (ref 0.55–1.02)
GFR SERPLBLD CREATININE-BSD FMLA CKD-EPI: >60 MLS/MIN/1.73/M2

## 2022-10-19 PROCEDURE — A9698 NON-RAD CONTRAST MATERIALNOC: HCPCS

## 2022-10-19 PROCEDURE — 36415 COLL VENOUS BLD VENIPUNCTURE: CPT | Performed by: OBSTETRICS & GYNECOLOGY

## 2022-10-19 PROCEDURE — 74177 CT ABD & PELVIS W/CONTRAST: CPT | Mod: TC

## 2022-10-19 PROCEDURE — 25500020 PHARM REV CODE 255: Performed by: OBSTETRICS & GYNECOLOGY

## 2022-10-19 PROCEDURE — 25500020 PHARM REV CODE 255

## 2022-10-19 PROCEDURE — 82565 ASSAY OF CREATININE: CPT | Performed by: OBSTETRICS & GYNECOLOGY

## 2022-10-19 RX ADMIN — IOHEXOL 100 ML: 350 INJECTION, SOLUTION INTRAVENOUS at 02:10

## 2022-10-19 RX ADMIN — BARIUM SULFATE 450 ML: 20 SUSPENSION ORAL at 09:10

## 2022-10-20 NOTE — PROGRESS NOTES
REFERRING PROVIDER  Eros Friedman MD     HISTORY OF PRESENT CONDITION  Chief Complaint   Patient presents with    Pelvic Mass     New patient (pelvic masses) fibroids       Lizbeth Leonard is a 42 y.o.  () who presents in consultation at for an opinion regarding a fibroid uterus. Regular monthly cycles 5 days in length, with heavy bleeding. No birth control on hormones.      PMHx: HTN and HLD.   Sx: only colostomy  Family Hx: denies any family history of breast, ovarian or colon cancers.     REVIEW OF SYSTEMS  All systems reviewed and negative except as noted in HPI.    OBJECTIVE   Vitals:    10/21/22 0940   BP: (!) 182/84   Pulse: 78      Body mass index is 49.46 kg/m².      1. General: Well appearing, no apparent distress, alert and oriented.  2. Lymph: Neck symmetric without cervical or supraclavicular adenopathy or mass.  3. Lungs: Normal respiratory rate, no accessory muscle use.  4. Psych: Normal affect.  5. Abdomen:  non-distended, soft, non-tender, are no masses, no ascites, no hepatosplenomegaly; RUQ colostomy with appliance; laparoscopic incisions  6. Skin: Warm, dry, no rashes or lesions.   7. Extremities: Bilateral lower extremities without edema or tenderness.  8. Genitourinary               Pelvic Examination including:                a. External genitalia are normal in appearance. No lesions noted.               b. Urethral meatus is normal size, location, and appearance.               c. Urethra is negative.               d. Bladder is nontender. No masses noted.               e. Vagina has normal mucosa with physiologic discharge. No lesions noted.              f. Uterus is full of fibroids and extending to above the umbilicus; normal mobility              g. Adnexa limited by body habitus   h. Rectum deferred    ECOG status: 2    LABORATORY DATA  Lab data reviewed.    RADIOLOGICAL DATA  Radiology data reviewed.    PATHOLOGY DATA  Pathology data reviewed.    ASSESSMENT / PLAN     1.  Intramural, submucous, and subserous leiomyoma of uterus    2. Morbid obesity with body mass index of 50 or higher       F/U Pap smear    PSH:  1/23/2022: Laparoscopic sigmoid colectomy with end colostomy    I discussed with the patient that the primary treatment for a symptomatic fibroid uterus is observation versus surgical management.  Surgical management will depend on the  intraoperative findings and sometimes frozen pathology.  Differential diagnoses includes a fibroid uterus or a uterine cancer.  I will plan to perform this in a open fashion.  The procedure and its risks and benefits were discussed in detail.      PATIENT EDUCATION  Ready to learn, no apparent learning barriers were identified; learning preferences include listening.  Explained diagnosis and treatment plan; patient expressed understanding of the content.    INFORMED CONSENT  Discussed the risks, benefits, and alternatives of the procedure and of possible blood transfusion.  Discussed the necessity of other members of the healthcare team participating in the procedure.  All questions answered and consent given.    Dm Harris

## 2022-10-21 ENCOUNTER — OFFICE VISIT (OUTPATIENT)
Dept: GYNECOLOGIC ONCOLOGY | Facility: CLINIC | Age: 42
End: 2022-10-21
Payer: MEDICAID

## 2022-10-21 VITALS
HEART RATE: 78 BPM | WEIGHT: 293 LBS | BODY MASS INDEX: 49.46 KG/M2 | SYSTOLIC BLOOD PRESSURE: 182 MMHG | DIASTOLIC BLOOD PRESSURE: 84 MMHG

## 2022-10-21 DIAGNOSIS — D25.2 INTRAMURAL, SUBMUCOUS, AND SUBSEROUS LEIOMYOMA OF UTERUS: Primary | ICD-10-CM

## 2022-10-21 DIAGNOSIS — D25.0 INTRAMURAL, SUBMUCOUS, AND SUBSEROUS LEIOMYOMA OF UTERUS: Primary | ICD-10-CM

## 2022-10-21 DIAGNOSIS — E66.01 MORBID OBESITY WITH BODY MASS INDEX OF 50 OR HIGHER: ICD-10-CM

## 2022-10-21 DIAGNOSIS — D25.1 INTRAMURAL, SUBMUCOUS, AND SUBSEROUS LEIOMYOMA OF UTERUS: Primary | ICD-10-CM

## 2022-10-21 PROCEDURE — 88175 CYTOPATH C/V AUTO FLUID REDO: CPT | Performed by: OBSTETRICS & GYNECOLOGY

## 2022-10-21 PROCEDURE — 99999 PR PBB SHADOW E&M-EST. PATIENT-LVL III: CPT | Mod: PBBFAC,,, | Performed by: OBSTETRICS & GYNECOLOGY

## 2022-10-21 PROCEDURE — 99205 PR OFFICE/OUTPT VISIT, NEW, LEVL V, 60-74 MIN: ICD-10-PCS | Mod: S$PBB,,, | Performed by: OBSTETRICS & GYNECOLOGY

## 2022-10-21 PROCEDURE — 3079F DIAST BP 80-89 MM HG: CPT | Mod: CPTII,,, | Performed by: OBSTETRICS & GYNECOLOGY

## 2022-10-21 PROCEDURE — 99205 OFFICE O/P NEW HI 60 MIN: CPT | Mod: S$PBB,,, | Performed by: OBSTETRICS & GYNECOLOGY

## 2022-10-21 PROCEDURE — 1159F PR MEDICATION LIST DOCUMENTED IN MEDICAL RECORD: ICD-10-PCS | Mod: CPTII,,, | Performed by: OBSTETRICS & GYNECOLOGY

## 2022-10-21 PROCEDURE — 3044F HG A1C LEVEL LT 7.0%: CPT | Mod: CPTII,,, | Performed by: OBSTETRICS & GYNECOLOGY

## 2022-10-21 PROCEDURE — 1159F MED LIST DOCD IN RCRD: CPT | Mod: CPTII,,, | Performed by: OBSTETRICS & GYNECOLOGY

## 2022-10-21 PROCEDURE — 99213 OFFICE O/P EST LOW 20 MIN: CPT | Mod: PBBFAC | Performed by: OBSTETRICS & GYNECOLOGY

## 2022-10-21 PROCEDURE — 3079F PR MOST RECENT DIASTOLIC BLOOD PRESSURE 80-89 MM HG: ICD-10-PCS | Mod: CPTII,,, | Performed by: OBSTETRICS & GYNECOLOGY

## 2022-10-21 PROCEDURE — 3077F PR MOST RECENT SYSTOLIC BLOOD PRESSURE >= 140 MM HG: ICD-10-PCS | Mod: CPTII,,, | Performed by: OBSTETRICS & GYNECOLOGY

## 2022-10-21 PROCEDURE — 99999 PR PBB SHADOW E&M-EST. PATIENT-LVL III: ICD-10-PCS | Mod: PBBFAC,,, | Performed by: OBSTETRICS & GYNECOLOGY

## 2022-10-21 PROCEDURE — 3044F PR MOST RECENT HEMOGLOBIN A1C LEVEL <7.0%: ICD-10-PCS | Mod: CPTII,,, | Performed by: OBSTETRICS & GYNECOLOGY

## 2022-10-21 PROCEDURE — 3077F SYST BP >= 140 MM HG: CPT | Mod: CPTII,,, | Performed by: OBSTETRICS & GYNECOLOGY

## 2022-10-21 PROCEDURE — 87624 HPV HI-RISK TYP POOLED RSLT: CPT | Performed by: OBSTETRICS & GYNECOLOGY

## 2022-10-21 RX ORDER — METOPROLOL TARTRATE 50 MG/1
50 TABLET ORAL
COMMUNITY
End: 2023-01-23 | Stop reason: SDUPTHER

## 2022-10-21 NOTE — Clinical Note
Hey, tried calling you.  I am not sure you got my message.  Happy to help, but I will more than likely have to do a vertical midline that goes above the umbilicus and all the way to the pubic symphysis with how big it is.  We can chat more by phone if easier.

## 2022-10-27 LAB
HPV HR 12 DNA SPEC QL NAA+PROBE: NEGATIVE
HPV16 AG SPEC QL: NEGATIVE
HPV18 DNA SPEC QL NAA+PROBE: NEGATIVE

## 2022-10-30 LAB
FINAL PATHOLOGIC DIAGNOSIS: NORMAL
Lab: NORMAL

## 2022-11-04 ENCOUNTER — TELEPHONE (OUTPATIENT)
Dept: SURGERY | Facility: CLINIC | Age: 42
End: 2022-11-04
Payer: MEDICAID

## 2022-11-04 ENCOUNTER — PATIENT MESSAGE (OUTPATIENT)
Dept: SURGERY | Facility: CLINIC | Age: 42
End: 2022-11-04
Payer: MEDICAID

## 2022-11-04 NOTE — TELEPHONE ENCOUNTER
Attempted to contact pt regarding VV on 11/8. Phone not available.    ----- Message from Eros Friedman MD sent at 11/4/2022  1:07 PM CDT -----  Regarding: video visit  Please make Video visit with this pt. This coming Tuesday morning - between 9 and 11    Thanks  cbw

## 2022-11-08 ENCOUNTER — OFFICE VISIT (OUTPATIENT)
Dept: SURGERY | Facility: CLINIC | Age: 42
End: 2022-11-08
Payer: MEDICAID

## 2022-11-08 ENCOUNTER — PATIENT MESSAGE (OUTPATIENT)
Dept: FAMILY MEDICINE | Facility: CLINIC | Age: 42
End: 2022-11-08
Payer: MEDICAID

## 2022-11-08 ENCOUNTER — TELEPHONE (OUTPATIENT)
Dept: FAMILY MEDICINE | Facility: CLINIC | Age: 42
End: 2022-11-08
Payer: MEDICAID

## 2022-11-08 ENCOUNTER — TELEPHONE (OUTPATIENT)
Dept: GYNECOLOGIC ONCOLOGY | Facility: CLINIC | Age: 42
End: 2022-11-08
Payer: MEDICAID

## 2022-11-08 DIAGNOSIS — D25.1 INTRAMURAL, SUBMUCOUS, AND SUBSEROUS LEIOMYOMA OF UTERUS: Primary | ICD-10-CM

## 2022-11-08 DIAGNOSIS — D25.2 INTRAMURAL, SUBMUCOUS, AND SUBSEROUS LEIOMYOMA OF UTERUS: Primary | ICD-10-CM

## 2022-11-08 DIAGNOSIS — D25.0 INTRAMURAL, SUBMUCOUS, AND SUBSEROUS LEIOMYOMA OF UTERUS: Primary | ICD-10-CM

## 2022-11-08 DIAGNOSIS — Z43.3 COLOSTOMY, EVALUATE: Primary | ICD-10-CM

## 2022-11-08 PROCEDURE — 99499 NO LOS: ICD-10-PCS | Mod: 95,,, | Performed by: COLON & RECTAL SURGERY

## 2022-11-08 PROCEDURE — 99499 UNLISTED E&M SERVICE: CPT | Mod: 95,,, | Performed by: COLON & RECTAL SURGERY

## 2022-11-08 NOTE — PROGRESS NOTES
The patient location is: [place]   The chief complaint leading to consultation is: unwanted colostomy    Visit type: audiovisual    Face to Face time with patient: 10  15 minutes of total time spent on the encounter, which includes face to face time and non-face to face time preparing to see the patient (eg, review of tests), Obtaining and/or reviewing separately obtained history, Documenting clinical information in the electronic or other health record, Independently interpreting results (not separately reported) and communicating results to the patient/family/caregiver, or Care coordination (not separately reported).         Each patient to whom he or she provides medical services by telemedicine is:  (1) informed of the relationship between the physician and patient and the respective role of any other health care provider with respect to management of the patient; and (2) notified that he or she may decline to receive medical services by telemedicine and may withdraw from such care at any time.    Notes:  Discussed colostomy closure.  I have explained the risks, benefits, and alternatives of the procedure in detail.  The patient voices understanding and all questions have been answered. The patient agrees to proceed as planned.

## 2022-11-08 NOTE — TELEPHONE ENCOUNTER
----- Message from Viraj Sofia sent at 11/8/2022  8:03 AM CST -----  Contact: self  Pt called and asked if she can possibly be seen today for her bronchitis. Pt can be reached at [phone]

## 2022-11-08 NOTE — TELEPHONE ENCOUNTER
Informed patient that we do not have any appointments today or this week. She will need to go to . [facility] to call back if needed.

## 2022-11-09 DIAGNOSIS — K57.20 DIVERTICULITIS OF LARGE INTESTINE WITH PERFORATION WITHOUT BLEEDING: Primary | ICD-10-CM

## 2022-11-11 ENCOUNTER — PATIENT MESSAGE (OUTPATIENT)
Dept: SURGERY | Facility: CLINIC | Age: 42
End: 2022-11-11
Payer: MEDICAID

## 2022-11-11 RX ORDER — METRONIDAZOLE 500 MG/1
500 TABLET ORAL 2 TIMES DAILY
Qty: 2 TABLET | Refills: 0 | Status: SHIPPED | OUTPATIENT
Start: 2022-11-11 | End: 2022-11-12

## 2022-11-11 RX ORDER — CIPROFLOXACIN 500 MG/1
500 TABLET ORAL 2 TIMES DAILY
Qty: 2 TABLET | Refills: 0 | Status: SHIPPED | OUTPATIENT
Start: 2022-11-11 | End: 2022-11-12

## 2022-11-14 ENCOUNTER — TELEPHONE (OUTPATIENT)
Dept: ENDOSCOPY | Facility: HOSPITAL | Age: 42
End: 2022-11-14
Payer: MEDICAID

## 2022-11-14 VITALS — HEIGHT: 66 IN | WEIGHT: 293 LBS | BODY MASS INDEX: 47.09 KG/M2

## 2022-11-14 DIAGNOSIS — Z12.11 SPECIAL SCREENING FOR MALIGNANT NEOPLASMS, COLON: Primary | ICD-10-CM

## 2022-11-14 DIAGNOSIS — K57.20 DIVERTICULITIS OF LARGE INTESTINE WITH PERFORATION WITHOUT BLEEDING: ICD-10-CM

## 2022-11-14 RX ORDER — SOD SULF/POT CHLORIDE/MAG SULF 1.479 G
12 TABLET ORAL DAILY
Qty: 24 TABLET | Refills: 0 | Status: SHIPPED | OUTPATIENT
Start: 2022-11-14

## 2022-11-14 NOTE — TELEPHONE ENCOUNTER
Called patient to schedule for a colonoscopy. No answer. Left message for patient to call Endoscopy Scheduling.  Phone number provided.

## 2022-11-16 DIAGNOSIS — F41.9 ANXIETY: ICD-10-CM

## 2022-11-17 ENCOUNTER — PATIENT MESSAGE (OUTPATIENT)
Dept: FAMILY MEDICINE | Facility: CLINIC | Age: 42
End: 2022-11-17
Payer: MEDICAID

## 2022-11-17 RX ORDER — ALPRAZOLAM 0.5 MG/1
0.5 TABLET ORAL 2 TIMES DAILY PRN
Qty: 30 TABLET | Refills: 1 | OUTPATIENT
Start: 2022-11-17

## 2022-11-17 NOTE — TELEPHONE ENCOUNTER
----- Message from Stacy Rosado sent at 11/17/2022  4:01 PM CST -----  Type:  Patient Returning Call    Who Called:Lizbeth Leonard    Who Left Message for Patient:-  Does the patient know what this is regarding? Procedure in [place]  Would the patient rather a call back or a response via [facility]? -  Best Call Back Number:[phone]    Additional Information:  retuning your call

## 2022-11-17 NOTE — TELEPHONE ENCOUNTER
Informed her that we will need a paper from her doctor to have a surgery clearance. Patient states that she is giving me the number on My chart.

## 2022-11-18 ENCOUNTER — TELEPHONE (OUTPATIENT)
Dept: PREADMISSION TESTING | Facility: HOSPITAL | Age: 42
End: 2022-11-18
Payer: MEDICAID

## 2022-11-18 NOTE — TELEPHONE ENCOUNTER
----- Message from Alla Villar RN sent at 11/17/2022  2:19 PM CST -----  Surgery date: 11/29/2022  PreOp appt: 11/27/2022 (Pt lives out of town, will be coming to [facility] prior to surgery)    Please call Pt and schedule the following preop appts:    Lab  EKG    Thank you!  Alla

## 2022-11-22 ENCOUNTER — PATIENT MESSAGE (OUTPATIENT)
Dept: ENDOSCOPY | Facility: HOSPITAL | Age: 42
End: 2022-11-22
Payer: MEDICAID

## 2022-11-22 ENCOUNTER — PATIENT MESSAGE (OUTPATIENT)
Dept: FAMILY MEDICINE | Facility: CLINIC | Age: 42
End: 2022-11-22
Payer: MEDICAID

## 2022-11-25 ENCOUNTER — TELEPHONE (OUTPATIENT)
Dept: SURGERY | Facility: CLINIC | Age: 42
End: 2022-11-25
Payer: MEDICAID

## 2022-11-25 NOTE — TELEPHONE ENCOUNTER
Called pt back regarding her message.  Pt stated that she is still experiencing a productive cough with yellow-green mucous and is having a Bronchitis flare up.  Pt received a steroid shot 1.5 weeks ago and was swabbed at that time but was negative for the flu and covis.  Pt's fiance now has the flu and she was around him today.  Pt is wondering if her colonoscopy and surgery will be Mon and Tues or will both procedures be postponed?  RN will reach out to Dr. Friedman and Dr. Harris' staff and ask if they would like to cancel or proceed and get back with pt.  Pt verbalized understanding to all.

## 2022-11-26 NOTE — TELEPHONE ENCOUNTER
Called pt and told her that I have not gotten a definite answer from a provider regarding her procedures on 11/28 and 11/29.  RN told pt to prep and plan accordingly as though everything is still on.  Pt will do clears and prep on 11/27 and report for colonoscopy on 11/28 unless told otherwise by a provider over the weekend.  Pt verbalized understanding to all.

## 2022-11-28 ENCOUNTER — TELEPHONE (OUTPATIENT)
Dept: SURGERY | Facility: CLINIC | Age: 42
End: 2022-11-28
Payer: MEDICAID

## 2022-11-28 NOTE — TELEPHONE ENCOUNTER
Called patient back. No answer, left a message. Rescheduling needs to be coordinated between Dr. Harris and Dr. Friedman.

## 2022-11-28 NOTE — TELEPHONE ENCOUNTER
----- Message from Thao Dawn sent at 11/28/2022  2:21 PM CST -----  Regarding: Pt Advice/  Contact: pt@[phone]  Pt is Requesting a Call back a Call back regarding Procedure  Pt states she has the Flu pt is Requesting to Reschedule Please Call to discuss further.

## 2022-11-30 ENCOUNTER — PATIENT MESSAGE (OUTPATIENT)
Dept: FAMILY MEDICINE | Facility: CLINIC | Age: 42
End: 2022-11-30
Payer: MEDICAID

## 2022-12-01 ENCOUNTER — PATIENT MESSAGE (OUTPATIENT)
Dept: FAMILY MEDICINE | Facility: CLINIC | Age: 42
End: 2022-12-01
Payer: MEDICAID

## 2022-12-15 ENCOUNTER — TELEPHONE (OUTPATIENT)
Dept: SURGERY | Facility: CLINIC | Age: 42
End: 2022-12-15
Payer: MEDICAID

## 2022-12-15 ENCOUNTER — PATIENT MESSAGE (OUTPATIENT)
Dept: SURGERY | Facility: CLINIC | Age: 42
End: 2022-12-15
Payer: MEDICAID

## 2022-12-16 ENCOUNTER — PATIENT MESSAGE (OUTPATIENT)
Dept: SURGERY | Facility: CLINIC | Age: 42
End: 2022-12-16
Payer: MEDICAID

## 2022-12-19 ENCOUNTER — PATIENT MESSAGE (OUTPATIENT)
Dept: FAMILY MEDICINE | Facility: CLINIC | Age: 42
End: 2022-12-19
Payer: MEDICAID

## 2023-01-03 ENCOUNTER — TELEPHONE (OUTPATIENT)
Dept: FAMILY MEDICINE | Facility: CLINIC | Age: 43
End: 2023-01-03
Payer: MEDICAID

## 2023-01-03 DIAGNOSIS — E78.2 MIXED HYPERLIPIDEMIA: Primary | ICD-10-CM

## 2023-01-03 NOTE — TELEPHONE ENCOUNTER
----- Message from Kerry Tomas NP sent at 9/16/2022  8:25 AM CDT -----  Regarding: recheck lipids and lfts - changed statin therapy 3 mo ago

## 2023-01-05 ENCOUNTER — PATIENT MESSAGE (OUTPATIENT)
Dept: FAMILY MEDICINE | Facility: CLINIC | Age: 43
End: 2023-01-05
Payer: MEDICAID

## 2023-01-05 NOTE — TELEPHONE ENCOUNTER
----- Message from Kerry Tomas NP sent at 5/18/2022  8:38 AM CDT -----  Please call and inform patient her recent mammogram results were within normal limits and instruct her to continue with annual screening.   Appointment reschedueld

## 2023-01-09 ENCOUNTER — PATIENT MESSAGE (OUTPATIENT)
Dept: FAMILY MEDICINE | Facility: CLINIC | Age: 43
End: 2023-01-09
Payer: MEDICAID

## 2023-01-09 ENCOUNTER — PATIENT MESSAGE (OUTPATIENT)
Dept: SURGERY | Facility: CLINIC | Age: 43
End: 2023-01-09
Payer: MEDICAID

## 2023-01-09 DIAGNOSIS — R05.9 COUGH, UNSPECIFIED TYPE: Primary | ICD-10-CM

## 2023-01-09 DIAGNOSIS — Z93.3 COLOSTOMY IN PLACE: Primary | ICD-10-CM

## 2023-01-09 RX ORDER — BENZONATATE 100 MG/1
100 CAPSULE ORAL 3 TIMES DAILY PRN
Qty: 30 CAPSULE | Refills: 0 | Status: SHIPPED | OUTPATIENT
Start: 2023-01-09 | End: 2023-01-19

## 2023-01-11 ENCOUNTER — TELEPHONE (OUTPATIENT)
Dept: SURGERY | Facility: CLINIC | Age: 43
End: 2023-01-11
Payer: MEDICAID

## 2023-01-11 DIAGNOSIS — Z01.818 PRE-OP EVALUATION: Primary | ICD-10-CM

## 2023-01-11 NOTE — TELEPHONE ENCOUNTER
Spoke with patient, message sent to endoscopy for scheduling of colonoscopy day prior to surgery. Reviewed pre op instructions with patient for surgery. Will email and mail pre op instructions to information on file. If unable to schedule labs, patient will have copy of lab orders and was instructed to send result via portal.

## 2023-01-12 ENCOUNTER — TELEPHONE (OUTPATIENT)
Dept: ENDOSCOPY | Facility: HOSPITAL | Age: 43
End: 2023-01-12
Payer: MEDICAID

## 2023-01-12 VITALS — WEIGHT: 290 LBS | BODY MASS INDEX: 48.32 KG/M2 | HEIGHT: 65 IN

## 2023-01-12 NOTE — PLAN OF CARE
Contacted patient in regards to message below. No answer. LVM for patient to call the endoscopy scheduling department at [phone].      Shauna Ledbetter RN  P [facility] Endo Schedulers  Caller: Unspecified (Yesterday,  3:07 PM)  Patient needs to be scheduled for a colonoscopy on 2/6 with Dr Friedman in preparation for surgery. Patient's previous colonoscopy was cancelled due to illness, please let me know if new order is needed, thanks!

## 2023-01-12 NOTE — PLAN OF CARE
Endoscopy procedure scheduled on 2/6/23, prep instructions reviewed, Pt verbalized understanding.

## 2023-01-23 ENCOUNTER — PATIENT MESSAGE (OUTPATIENT)
Dept: FAMILY MEDICINE | Facility: CLINIC | Age: 43
End: 2023-01-23
Payer: MEDICAID

## 2023-01-23 RX ORDER — METOPROLOL TARTRATE 50 MG/1
50 TABLET ORAL 2 TIMES DAILY
Qty: 180 TABLET | Refills: 0 | Status: SHIPPED | OUTPATIENT
Start: 2023-01-23 | End: 2023-04-23

## 2023-01-27 ENCOUNTER — PATIENT MESSAGE (OUTPATIENT)
Dept: FAMILY MEDICINE | Facility: CLINIC | Age: 43
End: 2023-01-27
Payer: MEDICAID

## 2023-01-31 RX ORDER — BENZONATATE 100 MG/1
100 CAPSULE ORAL 3 TIMES DAILY PRN
COMMUNITY
End: 2023-02-24 | Stop reason: SDUPTHER

## 2023-02-01 DIAGNOSIS — Z01.818 PRE-OP TESTING: Primary | ICD-10-CM

## 2023-02-01 NOTE — ANESTHESIA PAT ROS NOTE
02/01/2023  Lizbeth Leonard is a 42 y.o., female.      Pre-op Assessment          Review of Systems  Anesthesia Hx:  No problems with previous Anesthesia   History of prior surgery of interest to airway management or planning:  Previous anesthesia: General 1/2022 COLOSTOMY with general anesthesia.        Denies Family Hx of Anesthesia complications.    Denies Personal Hx of Anesthesia complications.                    Social:  Non-Smoker, No Alcohol Use       Hematology/Oncology:  Hematology Normal   Oncology Normal                                   EENT/Dental:  EENT/Dental Normal           Cardiovascular:     Hypertension       Denies Angina.     hyperlipidemia     Functional Capacity good / => 4 METS                         Pulmonary:       Denies Shortness of breath.  Denies Recent URI.                 Renal/:  Renal/ Normal                 Hepatic/GI:        COLOSTOMY IN PLACE. INCISION HERNIA.          Musculoskeletal:  Musculoskeletal Normal                Neurological:  Neurology Normal                                      Endocrine:        Morbid Obesity / BMI > 40  Psych:  Psychiatric History                     Anesthesia Assessment: Preoperative EQUATION    Planned Procedure: Procedure(s) (LRB):  CLOSURE, COLOSTOMY (N/A)  HYSTERECTOMY, TOTAL, ABDOMINAL, WITH BILATERAL SALPINGO-OOPHORECTOMY (N/A)  Requested Anesthesia Type:General  Surgeon: Eros Friedman MD  Service: Colon and Rectal  Known or anticipated Date of Surgery:2/7/2023    Surgeon notes: reviewed    Electronic QUestionnaire Assessment completed via nurse interview with patient.        Triage considerations:     The patient has no apparent active cardiac condition (No unstable coronary Syndrome such as severe unstable angina or recent [<1 month] myocardial infarction, decompensated CHF, severe valvular   disease or significant  arrhythmia)    Previous anesthesia records:Not available    Last PCP note: 6-12 months ago , within [facility]   Subspecialty notes:  GYN/ONC, CRS, OBGYN    Other important co-morbidities: HLD, HTN, and Morbid Obesity      Tests already available:  Available tests,  within 3 months , within [facility] .     1/6/23  CBC, LIPID PANEL, CMP            Instructions given. (See in Nurse's note)    Optimization:  Anesthesia Preop Clinic Assessment  Indicated - PATIENT UNABLE DUE TO TRAVEL HARDSHIP    Medical Opinion Indicated TBCB PCP       Sub-specialist consult indicated:   TBD       Plan:    Testing:  EKG and T&S- T&S AM OF SURGERY      Consultation:Patient's PCP for a statement of optimization      Patient  has previously scheduled Medical Appointment: 2/2    Navigation: Tests Scheduled.              Consults scheduled.             Results will be tracked by Preop Clinic.    MEDICAL CLEARANCE NOTED IN Epic 2/2/23:  42 y.o. female with planned surgery as above.    Known risk factors for perioperative complications: None    Difficulty with intubation is not anticipated.      Plan:       1. Preoperative workup as follows chest x-ray, ECG, hemoglobin, hematocrit, electrolytes, creatinine, glucose, liver function studies.  2. Change in medication regimen before surgery:  reviewed with patient by surgical team .  3. Invasive hemodynamic monitoring perioperatively: at the discretion of anesthesiologist.  4. Deep vein thrombosis prophylaxis postoperatively:regimen to be chosen by surgical team.  5. Surveillance for postoperative MI with ECG immediately postoperatively and on postoperative days 1 and 2 AND troponin levels 24 hours postoperatively and on day 4 or hospital discharge (whichever comes first): at the discretion of anesthesiologist.  6. Other measures:  N/A

## 2023-02-02 ENCOUNTER — OFFICE VISIT (OUTPATIENT)
Dept: FAMILY MEDICINE | Facility: CLINIC | Age: 43
End: 2023-02-02
Payer: MEDICAID

## 2023-02-02 ENCOUNTER — TELEPHONE (OUTPATIENT)
Dept: SURGERY | Facility: CLINIC | Age: 43
End: 2023-02-02
Payer: MEDICAID

## 2023-02-02 VITALS
SYSTOLIC BLOOD PRESSURE: 132 MMHG | DIASTOLIC BLOOD PRESSURE: 81 MMHG | OXYGEN SATURATION: 98 % | HEIGHT: 65 IN | HEART RATE: 82 BPM | BODY MASS INDEX: 48.82 KG/M2 | WEIGHT: 293 LBS | TEMPERATURE: 99 F | RESPIRATION RATE: 14 BRPM

## 2023-02-02 DIAGNOSIS — Z01.818 PREOPERATIVE EXAMINATION: Primary | ICD-10-CM

## 2023-02-02 PROCEDURE — 3008F PR BODY MASS INDEX (BMI) DOCUMENTED: ICD-10-PCS | Mod: CPTII,S$GLB,, | Performed by: OBSTETRICS & GYNECOLOGY

## 2023-02-02 PROCEDURE — 3008F BODY MASS INDEX DOCD: CPT | Mod: CPTII,S$GLB,, | Performed by: OBSTETRICS & GYNECOLOGY

## 2023-02-02 PROCEDURE — 3079F DIAST BP 80-89 MM HG: CPT | Mod: CPTII,S$GLB,, | Performed by: OBSTETRICS & GYNECOLOGY

## 2023-02-02 PROCEDURE — 3079F PR MOST RECENT DIASTOLIC BLOOD PRESSURE 80-89 MM HG: ICD-10-PCS | Mod: CPTII,S$GLB,, | Performed by: OBSTETRICS & GYNECOLOGY

## 2023-02-02 PROCEDURE — 3075F SYST BP GE 130 - 139MM HG: CPT | Mod: CPTII,S$GLB,, | Performed by: OBSTETRICS & GYNECOLOGY

## 2023-02-02 PROCEDURE — 99214 OFFICE O/P EST MOD 30 MIN: CPT | Mod: S$GLB,,, | Performed by: OBSTETRICS & GYNECOLOGY

## 2023-02-02 PROCEDURE — 99214 PR OFFICE/OUTPT VISIT, EST, LEVL IV, 30-39 MIN: ICD-10-PCS | Mod: S$GLB,,, | Performed by: OBSTETRICS & GYNECOLOGY

## 2023-02-02 PROCEDURE — 3075F PR MOST RECENT SYSTOLIC BLOOD PRESS GE 130-139MM HG: ICD-10-PCS | Mod: CPTII,S$GLB,, | Performed by: OBSTETRICS & GYNECOLOGY

## 2023-02-02 NOTE — PROGRESS NOTES
Subjective:      Lizbeth Leonard is a 42 y.o. female who presents to the office today for a preoperative consultation at the request of surgeon Dr. Friedman (performing a colostomy reversal) and Steven (performing a hysterectomy) on February 7.   Planned anesthesia: general.   The patient has the following known anesthesia issues:  none . Patients bleeding risk: no recent abnormal bleeding and no remote history of abnormal bleeding.   Patient does not have objections to receiving blood products if needed.    The following portions of the patient's history were reviewed and updated as appropriate: allergies, current medications, past family history, past medical history, past social history, past surgical history, and problem list.    Review of Systems   Review of Systems   Constitutional:  Negative for activity change, appetite change, fever and unexpected weight change.   HENT:  Negative for dental problem, hearing loss, sneezing, sore throat, trouble swallowing and voice change.    Eyes:  Negative for visual disturbance.   Respiratory:  Negative for choking, chest tightness, shortness of breath and stridor.    Cardiovascular:  Negative for chest pain and palpitations.   Gastrointestinal:  Negative for abdominal pain, anal bleeding, blood in stool, change in bowel habit, nausea, vomiting, fecal incontinence and change in bowel habit.   Endocrine: Negative for polydipsia, polyphagia and polyuria.   Genitourinary:  Negative for decreased urine volume, difficulty urinating, dysuria, frequency, hematuria and urgency.   Musculoskeletal:  Negative for gait problem, joint swelling, neck pain, neck stiffness and joint deformity.   Integumentary:  Negative for color change, pallor, rash, wound and mole/lesion.   Allergic/Immunologic: Negative for immunocompromised state.   Neurological:  Negative for vertigo, seizures, syncope, weakness, light-headedness, coordination difficulties and coordination difficulties.  "  Hematological:  Negative for adenopathy. Does not bruise/bleed easily.   Psychiatric/Behavioral:  Negative for agitation, behavioral problems, confusion, hallucinations, sleep disturbance and suicidal ideas.        Objective:    VITALS  BP Readings from Last 1 Encounters:   02/02/23 (!) 159/105   Weight: (!) 141.5 kg (312 lb)   Height: 5' 5" (165.1 cm)   BMI Readings from Last 1 Encounters:   02/02/23 51.92 kg/m²        Physical Exam  Vitals reviewed.   Constitutional:       General: She is not in acute distress.     Appearance: Normal appearance. She is not ill-appearing, toxic-appearing or diaphoretic.   HENT:      Head: Normocephalic and atraumatic.      Right Ear: External ear normal.      Left Ear: External ear normal.      Nose: Nose normal. No congestion or rhinorrhea.   Eyes:      General:         Right eye: No discharge.         Left eye: No discharge.   Neck:      Thyroid: No thyroid mass, thyromegaly or thyroid tenderness.   Cardiovascular:      Rate and Rhythm: Normal rate and regular rhythm.      Heart sounds: Normal heart sounds. No murmur heard.    No friction rub. No gallop.   Pulmonary:      Effort: Pulmonary effort is normal. No respiratory distress.      Breath sounds: Normal breath sounds. No stridor. No wheezing, rhonchi or rales.   Musculoskeletal:         General: Normal range of motion.      Cervical back: Normal range of motion and neck supple.      Right lower leg: No edema.      Left lower leg: No edema.   Skin:     General: Skin is warm and dry.      Coloration: Skin is not jaundiced or pale.      Findings: No rash.   Neurological:      General: No focal deficit present.      Mental Status: She is alert and oriented to person, place, and time.      Gait: Gait normal.   Psychiatric:         Mood and Affect: Mood normal.         Behavior: Behavior normal.         Thought Content: Thought content normal.         Judgment: Judgment normal.          Assessment:        1. Preoperative " examination         Cardiographics  ECG: normal sinus rhythm, no blocks or conduction defects, no ischemic changes  Echocardiogram: not done    Imaging  Chest x-ray: normal     Lab Review   Lab Visit on 01/06/2023   Component Date Value    Glucose 01/06/2023 84     BUN 01/06/2023 7.5     Creatinine 01/06/2023 0.68     AST 01/06/2023 10     ALT (SGPT) 01/06/2023 8     Alkaline Phosphatase 01/06/2023 62     Calcium 01/06/2023 9.3     Protein, Total 01/06/2023 7.0     Albumin 01/06/2023 4.1     BILIRUBIN, TOTAL 01/06/2023 0.82     Sodium 01/06/2023 141     Potassium 01/06/2023 4.7     Chloride 01/06/2023 103     CO2 01/06/2023 28     Globulin 01/06/2023 2.9     Albumin/Globulin Ratio 01/06/2023 1.4     BUN/Creatinine Ratio 01/06/2023 11.0     GFR ESTIMATION 01/06/2023 109.02     Anion Gap 01/06/2023 10.0     Cholesterol 01/06/2023 183     Triglycerides 01/06/2023 58     HDL 01/06/2023 67     LDL Cholesterol 01/06/2023 104.4 (H)     LDL/HDL Ratio 01/06/2023 1.6     WBC 01/06/2023 7.39     RBC 01/06/2023 4.61     RDW-SD 01/06/2023 49.5     Hemoglobin 01/06/2023 11.5 (L)     Hematocrit 01/06/2023 37.1     MCV 01/06/2023 80.5 (L)     MCH 01/06/2023 24.9 (L)     MCHC 01/06/2023 31.0 (L)     Platelets 01/06/2023 474 (H)     Neutrophils 01/06/2023 63.3     Lymphocytes 01/06/2023 27.9     Monocytes 01/06/2023 6.6     Eosinophils 01/06/2023 1.5     Basophils 01/06/2023 0.4     Neutrophils Absolute 01/06/2023 4.68     Lymphocytes Absolute 01/06/2023 2.06     Monocytes Absolute 01/06/2023 0.49     Eosinophils Absolute 01/06/2023 0.11     Basophils Absolute 01/06/2023 0.03     Immature Granulocytes Ab* 01/06/2023 0.02     Immature Granulocytes 01/06/2023 0.3     nRBC# 01/06/2023 0.0     nRBC Count Absolute 01/06/2023 0.000          Assessment:        42 y.o. female with planned surgery as above.    Known risk factors for perioperative complications: None    Difficulty with intubation is not anticipated.     Plan:      1.  Preoperative workup as follows chest x-ray, ECG, hemoglobin, hematocrit, electrolytes, creatinine, glucose, liver function studies.  2. Change in medication regimen before surgery:  reviewed with patient by surgical team .  3. Invasive hemodynamic monitoring perioperatively: at the discretion of anesthesiologist.  4. Deep vein thrombosis prophylaxis postoperatively:regimen to be chosen by surgical team.  5. Surveillance for postoperative MI with ECG immediately postoperatively and on postoperative days 1 and 2 AND troponin levels 24 hours postoperatively and on day 4 or hospital discharge (whichever comes first): at the discretion of anesthesiologist.  6. Other measures:  N/A

## 2023-02-02 NOTE — TELEPHONE ENCOUNTER
----- Message from Radha Concepcion sent at 2/2/2023 10:35 AM CST -----  Regarding: FW: Surgery Clearance Form  Contact: [phone]    ----- Message -----  From: Radha Concepcion  Sent: 2/2/2023  10:13 AM CST  To: Thom MARTINEZ Staff  Subject: Surgery Clearance Form                           [facility] calling to get surgery clearance form filled out for procedure scheduled on 02/07. Please fax to [phone]  Please call and adv @ [phone]

## 2023-02-02 NOTE — TELEPHONE ENCOUNTER
Spoke with Bhavaan at [facility] in [place]. Patient was seen for surgical clearance, note in epic. She wiill fax over lab and EKG.

## 2023-02-06 ENCOUNTER — ANESTHESIA EVENT (OUTPATIENT)
Dept: SURGERY | Facility: HOSPITAL | Age: 43
DRG: 330 | End: 2023-02-06
Payer: MEDICAID

## 2023-02-06 ENCOUNTER — TELEPHONE (OUTPATIENT)
Dept: SURGERY | Facility: CLINIC | Age: 43
End: 2023-02-06
Payer: MEDICAID

## 2023-02-06 ENCOUNTER — ANESTHESIA EVENT (OUTPATIENT)
Dept: ENDOSCOPY | Facility: HOSPITAL | Age: 43
DRG: 330 | End: 2023-02-06
Payer: MEDICAID

## 2023-02-06 ENCOUNTER — ANESTHESIA (OUTPATIENT)
Dept: ENDOSCOPY | Facility: HOSPITAL | Age: 43
DRG: 330 | End: 2023-02-06
Payer: MEDICAID

## 2023-02-06 PROCEDURE — 25000003 PHARM REV CODE 250: Performed by: NURSE ANESTHETIST, CERTIFIED REGISTERED

## 2023-02-06 PROCEDURE — 63600175 PHARM REV CODE 636 W HCPCS: Performed by: NURSE ANESTHETIST, CERTIFIED REGISTERED

## 2023-02-06 PROCEDURE — E9220 PRA ENDO ANESTHESIA: ICD-10-PCS | Mod: ,,, | Performed by: NURSE ANESTHETIST, CERTIFIED REGISTERED

## 2023-02-06 PROCEDURE — E9220 PRA ENDO ANESTHESIA: HCPCS | Mod: ,,, | Performed by: NURSE ANESTHETIST, CERTIFIED REGISTERED

## 2023-02-06 RX ORDER — PROPOFOL 10 MG/ML
VIAL (ML) INTRAVENOUS
Status: DISCONTINUED | OUTPATIENT
Start: 2023-02-06 | End: 2023-02-06

## 2023-02-06 RX ORDER — LIDOCAINE HYDROCHLORIDE 20 MG/ML
INJECTION INTRAVENOUS
Status: DISCONTINUED | OUTPATIENT
Start: 2023-02-06 | End: 2023-02-06

## 2023-02-06 RX ORDER — PROPOFOL 10 MG/ML
VIAL (ML) INTRAVENOUS CONTINUOUS PRN
Status: DISCONTINUED | OUTPATIENT
Start: 2023-02-06 | End: 2023-02-06

## 2023-02-06 RX ADMIN — LIDOCAINE HYDROCHLORIDE 50 MG: 20 INJECTION INTRAVENOUS at 08:02

## 2023-02-06 RX ADMIN — PROPOFOL 150 MCG/KG/MIN: 10 INJECTION, EMULSION INTRAVENOUS at 08:02

## 2023-02-06 RX ADMIN — PROPOFOL 80 MG: 10 INJECTION, EMULSION INTRAVENOUS at 08:02

## 2023-02-06 NOTE — ANESTHESIA POSTPROCEDURE EVALUATION
Anesthesia Post Evaluation    Patient: Lizbeth Leonard    Procedure(s) Performed: Procedure(s) (LRB):  COLONOSCOPY (N/A)    Final Anesthesia Type: general      Patient location during evaluation: GI PACU  Patient participation: Yes- Able to Participate  Level of consciousness: awake and alert  Post-procedure vital signs: reviewed and stable  Pain management: adequate  Airway patency: patent    PONV status at discharge: No PONV  Anesthetic complications: no      Cardiovascular status: stable  Respiratory status: spontaneous ventilation and face mask  Hydration status: euvolemic  Follow-up not needed.          Vitals Value Taken Time   /78 02/06/23 0905   Temp 36.6 °C (97.9 °F) 02/06/23 0839   Pulse 73 02/06/23 0905   Resp 16 02/06/23 0905   SpO2 98 % 02/06/23 0905         Event Time   Out of Recovery 09:08:18         Pain/Alisa Score: Alisa Score: 9 (2/6/2023  8:53 AM)

## 2023-02-06 NOTE — TRANSFER OF CARE
"Anesthesia Transfer of Care Note    Patient: Lizbeth Leonard    Procedure(s) Performed: Procedure(s) (LRB):  COLONOSCOPY (N/A)    Patient location: GI    Anesthesia Type: general    Transport from OR: Transported from OR on room air with adequate spontaneous ventilation    Post pain: adequate analgesia    Post assessment: no apparent anesthetic complications and tolerated procedure well    Post vital signs: stable    Level of consciousness: awake, alert and oriented    Nausea/Vomiting: no nausea/vomiting    Complications: none    Transfer of care protocol was followed      Last vitals:   Visit Vitals  BP (!) 103/55 (BP Location: Left arm, Patient Position: Lying)   Pulse 72   Temp 36.6 °C (97.9 °F) (Temporal)   Resp 16   Ht 5' 6" (1.676 m)   Wt 131.5 kg (290 lb)   SpO2 96%   Breastfeeding No   BMI 46.81 kg/m²     "

## 2023-02-06 NOTE — TELEPHONE ENCOUNTER
----- Message from Bella Carcamo sent at 2/6/2023 10:17 AM CST -----  Regarding: Missed Call  Contact: [phone]  Pt is returning a missed call.  Pt has procedure scheduled for tomorrow.  Please call with arrival time.

## 2023-02-06 NOTE — H&P (VIEW-ONLY)
"COLONOSCOPY HISTORY AND PHYSICAL EXAM    Procedure : Colonoscopy      INDICATIONS: asymptomatic screening exam and previous history of diverticulitis s/p emergent susana's procedure    Family Hx of CRC: denies    Last Colonoscopy:  never  Findings: -  Sedation Problems: NO  Fam Hx of Sedation Problems: NO     Past Medical History:   Diagnosis Date    HLD (hyperlipidemia)     HTN (hypertension)     Mental disorder      History reviewed. No pertinent family history.  Social History     Socioeconomic History    Marital status:    Tobacco Use    Smoking status: Never    Smokeless tobacco: Never   Substance and Sexual Activity    Alcohol use: Not Currently    Drug use: Never    Sexual activity: Yes     Partners: Male       Review of Systems - Negative except   Respiratory ROS: no dyspnea  Cardiovascular ROS: no exertional chest pain  Gastrointestinal ROS: NO abdominal discomfort,  NO rectal bleeding  Musculoskeletal ROS: no muscular pain  Neurological ROS: no recent stroke    Physical Exam:  BP (!) 189/103 (BP Location: Left arm, Patient Position: Lying)   Pulse 88   Temp 97.7 °F (36.5 °C) (Temporal)   Resp 16   Ht 5' 6" (1.676 m)   Wt 131.5 kg (290 lb)   SpO2 100%   Breastfeeding No   BMI 46.81 kg/m²   General: no distress  Head: normocephalic  Mallampati Score   Neck: supple, symmetrical, trachea midline  Lungs:  normal respiratory effort  Heart: regular rate and rhythm  Abdomen: soft, non-tender non-distented; bowel sounds normal; no masses,  no organomegaly  Extremities: no cyanosis or edema, or clubbing    ASA:  III    PLAN  COLONOSCOPY.    SedationPlan :MAC    The details of the procedure, the possible need for biopsy or polypectomy and the potential risks including bleeding, perforation, missed polyps were discussed in detail.    Alla Sykes MD MPA  Colorectal Surgery      "

## 2023-02-06 NOTE — TELEPHONE ENCOUNTER
Spoke with patient and informed that antibiotics are to be taken at 9:00 pm and 11:00 pm. Patient verbalizes understanding.

## 2023-02-06 NOTE — TELEPHONE ENCOUNTER
----- Message from Leena Stevens MA sent at 2/6/2023  3:03 PM CST -----  Regarding: Antibiotics  Contact: [phone]  Patient forgot when she's suppose to start antibiotics. Please call and advise.

## 2023-02-06 NOTE — ANESTHESIA PREPROCEDURE EVALUATION
02/06/2023  Lizbeth Leonard is a 42 y.o., female.    Active Problem List with Overview Notes    Diagnosis Date Noted    Anxiety 03/02/2022    COVID-19 03/02/2022    Diverticulitis of large intestine with perforation 03/02/2022    HTN (hypertension) 03/02/2022    Hypokalemia 03/02/2022    Obesity 03/02/2022    Perforation of sigmoid colon due to diverticulitis 03/02/2022    Persistent fatigue after COVID-19 03/02/2022     Past Surgical History:   Procedure Laterality Date    Robotic Sigmoid colectomy  01/23/2022    ... Creaction of end colostomy, drainage of intra abdominal abcess with washout, extensive lysis of dense adhesions       Pre-op Assessment    I have reviewed the Patient Summary Reports.    I have reviewed the NPO Status.   I have reviewed the Medications.     Review of Systems  Anesthesia Hx:  No problems with previous Anesthesia    Social:  Non-Smoker, Social Alcohol Use    Hematology/Oncology:  Hematology Normal   Oncology Normal     EENT/Dental:EENT/Dental Normal   Cardiovascular:   Hypertension    Pulmonary:  Pulmonary Normal    Renal/:  Renal/ Normal     Hepatic/GI:  Hepatic/GI Normal    Musculoskeletal:  Musculoskeletal Normal    Neurological:  Neurology Normal    Endocrine:  Endocrine Normal  Morbid Obesity / BMI > 40  Dermatological:  Skin Normal    Psych:   Psychiatric History anxiety          Physical Exam  General: Well nourished, Cooperative, Alert and Oriented    Airway:  Mallampati: II   Mouth Opening: Normal  TM Distance: Normal  Tongue: Normal  Neck ROM: Normal ROM    Dental:  Intact    Chest/Lungs:  Clear to auscultation, Normal Respiratory Rate    Heart:  Rate: Normal  Rhythm: Regular Rhythm        Anesthesia Plan  Type of Anesthesia, risks & benefits discussed:    Anesthesia Type: Gen Natural Airway  Intra-op Monitoring Plan: Standard ASA Monitors  Post Op Pain  Control Plan: multimodal analgesia  Induction:  IV  Informed Consent: Informed consent signed with the Patient and all parties understand the risks and agree with anesthesia plan.  All questions answered.   ASA Score: 3  Day of Surgery Review of History & Physical: H&P Update referred to the surgeon/provider.    Ready For Surgery From Anesthesia Perspective.     .

## 2023-02-07 ENCOUNTER — HOSPITAL ENCOUNTER (INPATIENT)
Facility: HOSPITAL | Age: 43
LOS: 4 days | Discharge: HOME HEALTH | DRG: 330 | End: 2023-02-11
Attending: COLON & RECTAL SURGERY | Admitting: COLON & RECTAL SURGERY
Payer: MEDICAID

## 2023-02-07 ENCOUNTER — ANESTHESIA (OUTPATIENT)
Dept: SURGERY | Facility: HOSPITAL | Age: 43
DRG: 330 | End: 2023-02-07
Payer: MEDICAID

## 2023-02-07 DIAGNOSIS — R00.0 TACHYCARDIA: ICD-10-CM

## 2023-02-07 DIAGNOSIS — Z93.3 COLOSTOMY IN PLACE: Primary | ICD-10-CM

## 2023-02-07 DIAGNOSIS — Z90.710 S/P ABDOMINAL HYSTERECTOMY: ICD-10-CM

## 2023-02-07 DIAGNOSIS — Z01.818 PRE-OP TESTING: ICD-10-CM

## 2023-02-07 LAB
ABO + RH BLD: NORMAL
ALBUMIN SERPL BCP-MCNC: 3.6 G/DL (ref 3.5–5.2)
ALP SERPL-CCNC: 55 U/L (ref 55–135)
ALT SERPL W/O P-5'-P-CCNC: 6 U/L (ref 10–44)
ANION GAP SERPL CALC-SCNC: 16 MMOL/L (ref 8–16)
AST SERPL-CCNC: 12 U/L (ref 10–40)
B-HCG UR QL: NEGATIVE
BASOPHILS # BLD AUTO: 0.04 K/UL (ref 0–0.2)
BASOPHILS NFR BLD: 0.3 % (ref 0–1.9)
BILIRUB SERPL-MCNC: 1.2 MG/DL (ref 0.1–1)
BLD GP AB SCN CELLS X3 SERPL QL: NORMAL
BUN SERPL-MCNC: 7 MG/DL (ref 6–20)
CALCIUM SERPL-MCNC: 8.8 MG/DL (ref 8.7–10.5)
CHLORIDE SERPL-SCNC: 106 MMOL/L (ref 95–110)
CO2 SERPL-SCNC: 17 MMOL/L (ref 23–29)
CREAT SERPL-MCNC: 0.9 MG/DL (ref 0.5–1.4)
CTP QC/QA: YES
DIFFERENTIAL METHOD: ABNORMAL
EOSINOPHIL # BLD AUTO: 0 K/UL (ref 0–0.5)
EOSINOPHIL NFR BLD: 0 % (ref 0–8)
ERYTHROCYTE [DISTWIDTH] IN BLOOD BY AUTOMATED COUNT: 16.5 % (ref 11.5–14.5)
EST. GFR  (NO RACE VARIABLE): >60 ML/MIN/1.73 M^2
GLUCOSE SERPL-MCNC: 181 MG/DL (ref 70–110)
HCT VFR BLD AUTO: 45.7 % (ref 37–48.5)
HGB BLD-MCNC: 13.9 G/DL (ref 12–16)
IMM GRANULOCYTES # BLD AUTO: 0.02 K/UL (ref 0–0.04)
IMM GRANULOCYTES NFR BLD AUTO: 0.2 % (ref 0–0.5)
LYMPHOCYTES # BLD AUTO: 0.4 K/UL (ref 1–4.8)
LYMPHOCYTES NFR BLD: 3.1 % (ref 18–48)
MAGNESIUM SERPL-MCNC: 1.7 MG/DL (ref 1.6–2.6)
MCH RBC QN AUTO: 24.7 PG (ref 27–31)
MCHC RBC AUTO-ENTMCNC: 30.4 G/DL (ref 32–36)
MCV RBC AUTO: 81 FL (ref 82–98)
MONOCYTES # BLD AUTO: 0.9 K/UL (ref 0.3–1)
MONOCYTES NFR BLD: 6.8 % (ref 4–15)
NEUTROPHILS # BLD AUTO: 11.1 K/UL (ref 1.8–7.7)
NEUTROPHILS NFR BLD: 89.6 % (ref 38–73)
NRBC BLD-RTO: 0 /100 WBC
PHOSPHATE SERPL-MCNC: 3.2 MG/DL (ref 2.7–4.5)
PLATELET # BLD AUTO: 486 K/UL (ref 150–450)
PMV BLD AUTO: 9.4 FL (ref 9.2–12.9)
POTASSIUM SERPL-SCNC: 3.7 MMOL/L (ref 3.5–5.1)
PROT SERPL-MCNC: 7.5 G/DL (ref 6–8.4)
RBC # BLD AUTO: 5.63 M/UL (ref 4–5.4)
SODIUM SERPL-SCNC: 139 MMOL/L (ref 136–145)
WBC # BLD AUTO: 12.42 K/UL (ref 3.9–12.7)

## 2023-02-07 PROCEDURE — 25000003 PHARM REV CODE 250: Performed by: STUDENT IN AN ORGANIZED HEALTH CARE EDUCATION/TRAINING PROGRAM

## 2023-02-07 PROCEDURE — 63600175 PHARM REV CODE 636 W HCPCS: Performed by: STUDENT IN AN ORGANIZED HEALTH CARE EDUCATION/TRAINING PROGRAM

## 2023-02-07 PROCEDURE — 36000708 HC OR TIME LEV III 1ST 15 MIN: Performed by: COLON & RECTAL SURGERY

## 2023-02-07 PROCEDURE — 88309 PR  SURG PATH,LEVEL VI: ICD-10-PCS | Mod: 26,,, | Performed by: PATHOLOGY

## 2023-02-07 PROCEDURE — 85025 COMPLETE CBC W/AUTO DIFF WBC: CPT

## 2023-02-07 PROCEDURE — 88307 PR  SURG PATH,LEVEL V: ICD-10-PCS | Mod: 26,,, | Performed by: PATHOLOGY

## 2023-02-07 PROCEDURE — 80053 COMPREHEN METABOLIC PANEL: CPT

## 2023-02-07 PROCEDURE — S0030 INJECTION, METRONIDAZOLE: HCPCS | Performed by: STUDENT IN AN ORGANIZED HEALTH CARE EDUCATION/TRAINING PROGRAM

## 2023-02-07 PROCEDURE — 25000003 PHARM REV CODE 250: Performed by: NURSE PRACTITIONER

## 2023-02-07 PROCEDURE — 52005 PR CYSTOURETHROSCOPY,URETER CATHETER: ICD-10-PCS | Mod: ,,, | Performed by: UROLOGY

## 2023-02-07 PROCEDURE — 36000709 HC OR TIME LEV III EA ADD 15 MIN: Performed by: COLON & RECTAL SURGERY

## 2023-02-07 PROCEDURE — 83735 ASSAY OF MAGNESIUM: CPT

## 2023-02-07 PROCEDURE — 88309 TISSUE EXAM BY PATHOLOGIST: CPT | Mod: 26,,, | Performed by: PATHOLOGY

## 2023-02-07 PROCEDURE — 86900 BLOOD TYPING SEROLOGIC ABO: CPT | Performed by: NURSE PRACTITIONER

## 2023-02-07 PROCEDURE — 52005 CYSTO W/URTRL CATHJ: CPT | Mod: ,,, | Performed by: UROLOGY

## 2023-02-07 PROCEDURE — 88302 TISSUE EXAM BY PATHOLOGIST: CPT | Performed by: PATHOLOGY

## 2023-02-07 PROCEDURE — D9220A PRA ANESTHESIA: Mod: CRNA,,, | Performed by: STUDENT IN AN ORGANIZED HEALTH CARE EDUCATION/TRAINING PROGRAM

## 2023-02-07 PROCEDURE — 88305 TISSUE EXAM BY PATHOLOGIST: CPT | Mod: 26,,, | Performed by: PATHOLOGY

## 2023-02-07 PROCEDURE — 88305 TISSUE EXAM BY PATHOLOGIST: CPT | Performed by: PATHOLOGY

## 2023-02-07 PROCEDURE — D9220A PRA ANESTHESIA: Mod: ANES,,, | Performed by: ANESTHESIOLOGY

## 2023-02-07 PROCEDURE — 88302 TISSUE EXAM BY PATHOLOGIST: CPT | Mod: 26,,, | Performed by: PATHOLOGY

## 2023-02-07 PROCEDURE — C1758 CATHETER, URETERAL: HCPCS | Performed by: COLON & RECTAL SURGERY

## 2023-02-07 PROCEDURE — 44626 REPAIR BOWEL OPENING: CPT | Mod: ,,, | Performed by: COLON & RECTAL SURGERY

## 2023-02-07 PROCEDURE — 81025 URINE PREGNANCY TEST: CPT | Performed by: COLON & RECTAL SURGERY

## 2023-02-07 PROCEDURE — 63600175 PHARM REV CODE 636 W HCPCS: Mod: TB,JG | Performed by: NURSE ANESTHETIST, CERTIFIED REGISTERED

## 2023-02-07 PROCEDURE — 88309 TISSUE EXAM BY PATHOLOGIST: CPT | Performed by: PATHOLOGY

## 2023-02-07 PROCEDURE — 88305 TISSUE EXAM BY PATHOLOGIST: ICD-10-PCS | Mod: 26,,, | Performed by: PATHOLOGY

## 2023-02-07 PROCEDURE — 88302 PR  SURG PATH,LEVEL II: ICD-10-PCS | Mod: 26,,, | Performed by: PATHOLOGY

## 2023-02-07 PROCEDURE — 44626 PR CLOSE ENTEROSTOMY,RESEC+COLOREC ANAS: ICD-10-PCS | Mod: ,,, | Performed by: COLON & RECTAL SURGERY

## 2023-02-07 PROCEDURE — 63600175 PHARM REV CODE 636 W HCPCS

## 2023-02-07 PROCEDURE — 37000008 HC ANESTHESIA 1ST 15 MINUTES: Performed by: COLON & RECTAL SURGERY

## 2023-02-07 PROCEDURE — 37000009 HC ANESTHESIA EA ADD 15 MINS: Performed by: COLON & RECTAL SURGERY

## 2023-02-07 PROCEDURE — 63600175 PHARM REV CODE 636 W HCPCS: Performed by: NURSE PRACTITIONER

## 2023-02-07 PROCEDURE — 93010 EKG 12-LEAD: ICD-10-PCS | Mod: ,,, | Performed by: INTERNAL MEDICINE

## 2023-02-07 PROCEDURE — C1769 GUIDE WIRE: HCPCS | Performed by: COLON & RECTAL SURGERY

## 2023-02-07 PROCEDURE — 58150 TOTAL HYSTERECTOMY: CPT | Mod: 22,,, | Performed by: OBSTETRICS & GYNECOLOGY

## 2023-02-07 PROCEDURE — 58150 PR TOTAL ABDOM HYSTERECTOMY: ICD-10-PCS | Mod: 22,,, | Performed by: OBSTETRICS & GYNECOLOGY

## 2023-02-07 PROCEDURE — 71000039 HC RECOVERY, EACH ADD'L HOUR: Performed by: COLON & RECTAL SURGERY

## 2023-02-07 PROCEDURE — D9220A PRA ANESTHESIA: ICD-10-PCS | Mod: ANES,,, | Performed by: ANESTHESIOLOGY

## 2023-02-07 PROCEDURE — 20600001 HC STEP DOWN PRIVATE ROOM

## 2023-02-07 PROCEDURE — 27201423 OPTIME MED/SURG SUP & DEVICES STERILE SUPPLY: Performed by: COLON & RECTAL SURGERY

## 2023-02-07 PROCEDURE — 84100 ASSAY OF PHOSPHORUS: CPT

## 2023-02-07 PROCEDURE — 88307 TISSUE EXAM BY PATHOLOGIST: CPT | Performed by: PATHOLOGY

## 2023-02-07 PROCEDURE — 71000033 HC RECOVERY, INTIAL HOUR: Performed by: COLON & RECTAL SURGERY

## 2023-02-07 PROCEDURE — D9220A PRA ANESTHESIA: ICD-10-PCS | Mod: CRNA,,, | Performed by: STUDENT IN AN ORGANIZED HEALTH CARE EDUCATION/TRAINING PROGRAM

## 2023-02-07 PROCEDURE — 93010 ELECTROCARDIOGRAM REPORT: CPT | Mod: ,,, | Performed by: INTERNAL MEDICINE

## 2023-02-07 PROCEDURE — 93005 ELECTROCARDIOGRAM TRACING: CPT

## 2023-02-07 PROCEDURE — 88307 TISSUE EXAM BY PATHOLOGIST: CPT | Mod: 26,,, | Performed by: PATHOLOGY

## 2023-02-07 PROCEDURE — 71000015 HC POSTOP RECOV 1ST HR: Performed by: COLON & RECTAL SURGERY

## 2023-02-07 PROCEDURE — 36415 COLL VENOUS BLD VENIPUNCTURE: CPT | Performed by: NURSE PRACTITIONER

## 2023-02-07 PROCEDURE — 71000016 HC POSTOP RECOV ADDL HR: Performed by: COLON & RECTAL SURGERY

## 2023-02-07 RX ORDER — PROCHLORPERAZINE EDISYLATE 5 MG/ML
5 INJECTION INTRAMUSCULAR; INTRAVENOUS EVERY 30 MIN PRN
Status: COMPLETED | OUTPATIENT
Start: 2023-02-07 | End: 2023-02-07

## 2023-02-07 RX ORDER — ALVIMOPAN 12 MG/1
12 CAPSULE ORAL ONCE
Status: COMPLETED | OUTPATIENT
Start: 2023-02-07 | End: 2023-02-07

## 2023-02-07 RX ORDER — ACETAMINOPHEN 500 MG
1000 TABLET ORAL ONCE
Status: DISCONTINUED | OUTPATIENT
Start: 2023-02-07 | End: 2023-02-07

## 2023-02-07 RX ORDER — METRONIDAZOLE 500 MG/100ML
500 INJECTION, SOLUTION INTRAVENOUS
Status: DISCONTINUED | OUTPATIENT
Start: 2023-02-07 | End: 2023-02-08

## 2023-02-07 RX ORDER — GABAPENTIN 300 MG/1
300 CAPSULE ORAL 3 TIMES DAILY
Status: DISCONTINUED | OUTPATIENT
Start: 2023-02-07 | End: 2023-02-07

## 2023-02-07 RX ORDER — HEPARIN SODIUM 5000 [USP'U]/ML
5000 INJECTION, SOLUTION INTRAVENOUS; SUBCUTANEOUS EVERY 8 HOURS
Status: COMPLETED | OUTPATIENT
Start: 2023-02-07 | End: 2023-02-07

## 2023-02-07 RX ORDER — OXYCODONE HYDROCHLORIDE 5 MG/1
5 TABLET ORAL EVERY 6 HOURS PRN
Status: DISCONTINUED | OUTPATIENT
Start: 2023-02-07 | End: 2023-02-08

## 2023-02-07 RX ORDER — KETAMINE HCL IN 0.9 % NACL 50 MG/5 ML
SYRINGE (ML) INTRAVENOUS
Status: DISCONTINUED | OUTPATIENT
Start: 2023-02-07 | End: 2023-02-07

## 2023-02-07 RX ORDER — SODIUM CHLORIDE 0.9 % (FLUSH) 0.9 %
10 SYRINGE (ML) INJECTION
Status: DISCONTINUED | OUTPATIENT
Start: 2023-02-07 | End: 2023-02-11 | Stop reason: HOSPADM

## 2023-02-07 RX ORDER — DEXAMETHASONE SODIUM PHOSPHATE 4 MG/ML
INJECTION, SOLUTION INTRA-ARTICULAR; INTRALESIONAL; INTRAMUSCULAR; INTRAVENOUS; SOFT TISSUE
Status: DISCONTINUED | OUTPATIENT
Start: 2023-02-07 | End: 2023-02-07

## 2023-02-07 RX ORDER — MUPIROCIN 20 MG/G
1 OINTMENT TOPICAL
Status: COMPLETED | OUTPATIENT
Start: 2023-02-07 | End: 2023-02-07

## 2023-02-07 RX ORDER — PHENYLEPHRINE HCL IN 0.9% NACL 1 MG/10 ML
SYRINGE (ML) INTRAVENOUS
Status: DISCONTINUED | OUTPATIENT
Start: 2023-02-07 | End: 2023-02-07

## 2023-02-07 RX ORDER — FENTANYL CITRATE 50 UG/ML
INJECTION, SOLUTION INTRAMUSCULAR; INTRAVENOUS
Status: DISCONTINUED | OUTPATIENT
Start: 2023-02-07 | End: 2023-02-07

## 2023-02-07 RX ORDER — SODIUM CHLORIDE 9 MG/ML
INJECTION, SOLUTION INTRAVENOUS CONTINUOUS
Status: DISCONTINUED | OUTPATIENT
Start: 2023-02-07 | End: 2023-02-08

## 2023-02-07 RX ORDER — METRONIDAZOLE 500 MG/100ML
INJECTION, SOLUTION INTRAVENOUS
Status: DISCONTINUED | OUTPATIENT
Start: 2023-02-07 | End: 2023-02-07

## 2023-02-07 RX ORDER — HYDROMORPHONE HYDROCHLORIDE 1 MG/ML
0.2 INJECTION, SOLUTION INTRAMUSCULAR; INTRAVENOUS; SUBCUTANEOUS EVERY 5 MIN PRN
Status: DISCONTINUED | OUTPATIENT
Start: 2023-02-07 | End: 2023-02-10

## 2023-02-07 RX ORDER — MAGNESIUM SULFATE HEPTAHYDRATE 40 MG/ML
2 INJECTION, SOLUTION INTRAVENOUS ONCE
Status: COMPLETED | OUTPATIENT
Start: 2023-02-07 | End: 2023-02-08

## 2023-02-07 RX ORDER — GABAPENTIN 300 MG/1
300 CAPSULE ORAL 3 TIMES DAILY
Status: DISCONTINUED | OUTPATIENT
Start: 2023-02-07 | End: 2023-02-11 | Stop reason: HOSPADM

## 2023-02-07 RX ORDER — ACETAMINOPHEN 500 MG
1000 TABLET ORAL EVERY 8 HOURS
Status: DISCONTINUED | OUTPATIENT
Start: 2023-02-09 | End: 2023-02-11 | Stop reason: HOSPADM

## 2023-02-07 RX ORDER — LIDOCAINE HYDROCHLORIDE 20 MG/ML
INJECTION, SOLUTION EPIDURAL; INFILTRATION; INTRACAUDAL; PERINEURAL
Status: DISCONTINUED | OUTPATIENT
Start: 2023-02-07 | End: 2023-02-07

## 2023-02-07 RX ORDER — ACETAMINOPHEN 10 MG/ML
INJECTION, SOLUTION INTRAVENOUS
Status: DISCONTINUED | OUTPATIENT
Start: 2023-02-07 | End: 2023-02-07

## 2023-02-07 RX ORDER — METOPROLOL TARTRATE 50 MG/1
50 TABLET ORAL 2 TIMES DAILY
Status: DISCONTINUED | OUTPATIENT
Start: 2023-02-07 | End: 2023-02-09

## 2023-02-07 RX ORDER — ONDANSETRON 2 MG/ML
4 INJECTION INTRAMUSCULAR; INTRAVENOUS EVERY 12 HOURS PRN
Status: DISCONTINUED | OUTPATIENT
Start: 2023-02-07 | End: 2023-02-11 | Stop reason: HOSPADM

## 2023-02-07 RX ORDER — MUPIROCIN 20 MG/G
OINTMENT TOPICAL 2 TIMES DAILY
Status: DISCONTINUED | OUTPATIENT
Start: 2023-02-07 | End: 2023-02-11 | Stop reason: HOSPADM

## 2023-02-07 RX ORDER — ROCURONIUM BROMIDE 10 MG/ML
INJECTION, SOLUTION INTRAVENOUS
Status: DISCONTINUED | OUTPATIENT
Start: 2023-02-07 | End: 2023-02-07

## 2023-02-07 RX ORDER — ALVIMOPAN 12 MG/1
12 CAPSULE ORAL 2 TIMES DAILY
Status: DISCONTINUED | OUTPATIENT
Start: 2023-02-07 | End: 2023-02-11 | Stop reason: HOSPADM

## 2023-02-07 RX ORDER — ATORVASTATIN CALCIUM 40 MG/1
80 TABLET, FILM COATED ORAL DAILY
Status: DISCONTINUED | OUTPATIENT
Start: 2023-02-08 | End: 2023-02-11 | Stop reason: HOSPADM

## 2023-02-07 RX ORDER — IBUPROFEN 400 MG/1
800 TABLET ORAL EVERY 8 HOURS
Status: DISCONTINUED | OUTPATIENT
Start: 2023-02-08 | End: 2023-02-11 | Stop reason: HOSPADM

## 2023-02-07 RX ORDER — TRAMADOL HYDROCHLORIDE 50 MG/1
50 TABLET ORAL EVERY 6 HOURS PRN
Status: DISCONTINUED | OUTPATIENT
Start: 2023-02-07 | End: 2023-02-11 | Stop reason: HOSPADM

## 2023-02-07 RX ORDER — DEXMEDETOMIDINE HYDROCHLORIDE 100 UG/ML
INJECTION, SOLUTION INTRAVENOUS
Status: DISCONTINUED | OUTPATIENT
Start: 2023-02-07 | End: 2023-02-07

## 2023-02-07 RX ORDER — ALPRAZOLAM 0.5 MG/1
0.5 TABLET ORAL 2 TIMES DAILY PRN
Status: DISCONTINUED | OUTPATIENT
Start: 2023-02-07 | End: 2023-02-11 | Stop reason: HOSPADM

## 2023-02-07 RX ORDER — HYDROMORPHONE HYDROCHLORIDE 2 MG/ML
INJECTION, SOLUTION INTRAMUSCULAR; INTRAVENOUS; SUBCUTANEOUS
Status: DISCONTINUED | OUTPATIENT
Start: 2023-02-07 | End: 2023-02-07

## 2023-02-07 RX ORDER — GABAPENTIN 300 MG/1
300 CAPSULE ORAL
Status: COMPLETED | OUTPATIENT
Start: 2023-02-07 | End: 2023-02-07

## 2023-02-07 RX ORDER — OXYCODONE HYDROCHLORIDE 10 MG/1
10 TABLET ORAL EVERY 4 HOURS PRN
Status: DISCONTINUED | OUTPATIENT
Start: 2023-02-07 | End: 2023-02-08

## 2023-02-07 RX ORDER — ONDANSETRON 2 MG/ML
INJECTION INTRAMUSCULAR; INTRAVENOUS
Status: DISCONTINUED | OUTPATIENT
Start: 2023-02-07 | End: 2023-02-07

## 2023-02-07 RX ORDER — ACETAMINOPHEN 10 MG/ML
1000 INJECTION, SOLUTION INTRAVENOUS EVERY 8 HOURS
Status: COMPLETED | OUTPATIENT
Start: 2023-02-08 | End: 2023-02-08

## 2023-02-07 RX ORDER — LIDOCAINE HYDROCHLORIDE 10 MG/ML
1 INJECTION, SOLUTION EPIDURAL; INFILTRATION; INTRACAUDAL; PERINEURAL
Status: DISCONTINUED | OUTPATIENT
Start: 2023-02-07 | End: 2023-02-08

## 2023-02-07 RX ORDER — ACETAMINOPHEN 650 MG/20.3ML
975 LIQUID ORAL
Status: COMPLETED | OUTPATIENT
Start: 2023-02-07 | End: 2023-02-07

## 2023-02-07 RX ORDER — TRIPROLIDINE/PSEUDOEPHEDRINE 2.5MG-60MG
600 TABLET ORAL
Status: COMPLETED | OUTPATIENT
Start: 2023-02-07 | End: 2023-02-07

## 2023-02-07 RX ORDER — PROPOFOL 10 MG/ML
VIAL (ML) INTRAVENOUS
Status: DISCONTINUED | OUTPATIENT
Start: 2023-02-07 | End: 2023-02-07

## 2023-02-07 RX ORDER — ESCITALOPRAM OXALATE 20 MG/1
20 TABLET ORAL DAILY
Status: DISCONTINUED | OUTPATIENT
Start: 2023-02-08 | End: 2023-02-11 | Stop reason: HOSPADM

## 2023-02-07 RX ORDER — SODIUM CHLORIDE 0.9 % (FLUSH) 0.9 %
10 SYRINGE (ML) INJECTION
Status: ACTIVE | OUTPATIENT
Start: 2023-02-07

## 2023-02-07 RX ORDER — SODIUM CHLORIDE 9 MG/ML
INJECTION, SOLUTION INTRAVENOUS
Status: DISCONTINUED | OUTPATIENT
Start: 2023-02-07 | End: 2023-02-08

## 2023-02-07 RX ORDER — MIDAZOLAM HYDROCHLORIDE 1 MG/ML
INJECTION, SOLUTION INTRAMUSCULAR; INTRAVENOUS
Status: DISCONTINUED | OUTPATIENT
Start: 2023-02-07 | End: 2023-02-07

## 2023-02-07 RX ADMIN — SODIUM CHLORIDE: 9 INJECTION, SOLUTION INTRAVENOUS at 06:02

## 2023-02-07 RX ADMIN — HYDROMORPHONE HYDROCHLORIDE 0.5 MG: 2 INJECTION INTRAMUSCULAR; INTRAVENOUS; SUBCUTANEOUS at 05:02

## 2023-02-07 RX ADMIN — METOPROLOL TARTRATE 50 MG: 50 TABLET, FILM COATED ORAL at 09:02

## 2023-02-07 RX ADMIN — HYDROMORPHONE HYDROCHLORIDE 0.2 MG: 1 INJECTION, SOLUTION INTRAMUSCULAR; INTRAVENOUS; SUBCUTANEOUS at 07:02

## 2023-02-07 RX ADMIN — ROCURONIUM BROMIDE 20 MG: 10 INJECTION INTRAVENOUS at 01:02

## 2023-02-07 RX ADMIN — ROCURONIUM BROMIDE 50 MG: 10 INJECTION INTRAVENOUS at 11:02

## 2023-02-07 RX ADMIN — ROCURONIUM BROMIDE 20 MG: 10 INJECTION INTRAVENOUS at 02:02

## 2023-02-07 RX ADMIN — HYDROMORPHONE HYDROCHLORIDE 0.2 MG: 1 INJECTION, SOLUTION INTRAMUSCULAR; INTRAVENOUS; SUBCUTANEOUS at 09:02

## 2023-02-07 RX ADMIN — FENTANYL CITRATE 50 MCG: 50 INJECTION, SOLUTION INTRAMUSCULAR; INTRAVENOUS at 12:02

## 2023-02-07 RX ADMIN — IBUPROFEN 600 MG: 200 SUSPENSION ORAL at 10:02

## 2023-02-07 RX ADMIN — CEFTRIAXONE 2 G: 2 INJECTION, POWDER, FOR SOLUTION INTRAMUSCULAR; INTRAVENOUS at 11:02

## 2023-02-07 RX ADMIN — Medication 100 MCG: at 11:02

## 2023-02-07 RX ADMIN — DEXMEDETOMIDINE HYDROCHLORIDE 8 MCG: 100 INJECTION, SOLUTION INTRAVENOUS at 05:02

## 2023-02-07 RX ADMIN — GABAPENTIN 300 MG: 300 CAPSULE ORAL at 10:02

## 2023-02-07 RX ADMIN — SODIUM CHLORIDE, POTASSIUM CHLORIDE, SODIUM LACTATE AND CALCIUM CHLORIDE 1000 ML: 600; 310; 30; 20 INJECTION, SOLUTION INTRAVENOUS at 10:02

## 2023-02-07 RX ADMIN — ACETAMINOPHEN 976.6 MG: 650 SOLUTION ORAL at 10:02

## 2023-02-07 RX ADMIN — Medication 10 MG: at 01:02

## 2023-02-07 RX ADMIN — FENTANYL CITRATE 50 MCG: 50 INJECTION, SOLUTION INTRAMUSCULAR; INTRAVENOUS at 11:02

## 2023-02-07 RX ADMIN — HEPARIN SODIUM 5000 UNITS: 5000 INJECTION INTRAVENOUS; SUBCUTANEOUS at 10:02

## 2023-02-07 RX ADMIN — DEXAMETHASONE SODIUM PHOSPHATE 4 MG: 4 INJECTION INTRA-ARTICULAR; INTRALESIONAL; INTRAMUSCULAR; INTRAVENOUS; SOFT TISSUE at 11:02

## 2023-02-07 RX ADMIN — PROCHLORPERAZINE EDISYLATE 5 MG: 5 INJECTION INTRAMUSCULAR; INTRAVENOUS at 09:02

## 2023-02-07 RX ADMIN — ROCURONIUM BROMIDE 10 MG: 10 INJECTION INTRAVENOUS at 01:02

## 2023-02-07 RX ADMIN — SODIUM CHLORIDE, SODIUM GLUCONATE, SODIUM ACETATE, POTASSIUM CHLORIDE, MAGNESIUM CHLORIDE, SODIUM PHOSPHATE, DIBASIC, AND POTASSIUM PHOSPHATE: .53; .5; .37; .037; .03; .012; .00082 INJECTION, SOLUTION INTRAVENOUS at 11:02

## 2023-02-07 RX ADMIN — MUPIROCIN: 20 OINTMENT TOPICAL at 09:02

## 2023-02-07 RX ADMIN — SODIUM CHLORIDE: 0.9 INJECTION, SOLUTION INTRAVENOUS at 04:02

## 2023-02-07 RX ADMIN — Medication 20 MG: at 11:02

## 2023-02-07 RX ADMIN — ACETAMINOPHEN 1000 MG: 10 INJECTION INTRAVENOUS at 04:02

## 2023-02-07 RX ADMIN — ROCURONIUM BROMIDE 20 MG: 10 INJECTION INTRAVENOUS at 12:02

## 2023-02-07 RX ADMIN — ROCURONIUM BROMIDE 10 MG: 10 INJECTION INTRAVENOUS at 03:02

## 2023-02-07 RX ADMIN — PROPOFOL 200 MG: 10 INJECTION, EMULSION INTRAVENOUS at 11:02

## 2023-02-07 RX ADMIN — Medication 10 MG: at 12:02

## 2023-02-07 RX ADMIN — METRONIDAZOLE 500 MG: 500 INJECTION, SOLUTION INTRAVENOUS at 11:02

## 2023-02-07 RX ADMIN — ROCURONIUM BROMIDE 20 MG: 10 INJECTION INTRAVENOUS at 11:02

## 2023-02-07 RX ADMIN — PROCHLORPERAZINE EDISYLATE 5 MG: 5 INJECTION INTRAMUSCULAR; INTRAVENOUS at 07:02

## 2023-02-07 RX ADMIN — OXYCODONE HYDROCHLORIDE 10 MG: 10 TABLET ORAL at 09:02

## 2023-02-07 RX ADMIN — Medication 10 MG: at 02:02

## 2023-02-07 RX ADMIN — ONDANSETRON 4 MG: 2 INJECTION INTRAMUSCULAR; INTRAVENOUS at 07:02

## 2023-02-07 RX ADMIN — LIDOCAINE HYDROCHLORIDE 100 MG: 20 INJECTION, SOLUTION EPIDURAL; INFILTRATION; INTRACAUDAL; PERINEURAL at 11:02

## 2023-02-07 RX ADMIN — SODIUM CHLORIDE, SODIUM GLUCONATE, SODIUM ACETATE, POTASSIUM CHLORIDE, MAGNESIUM CHLORIDE, SODIUM PHOSPHATE, DIBASIC, AND POTASSIUM PHOSPHATE: .53; .5; .37; .037; .03; .012; .00082 INJECTION, SOLUTION INTRAVENOUS at 12:02

## 2023-02-07 RX ADMIN — ROCURONIUM BROMIDE 30 MG: 10 INJECTION INTRAVENOUS at 12:02

## 2023-02-07 RX ADMIN — SODIUM CHLORIDE: 0.9 INJECTION, SOLUTION INTRAVENOUS at 10:02

## 2023-02-07 RX ADMIN — MIDAZOLAM 2 MG: 1 INJECTION INTRAMUSCULAR; INTRAVENOUS at 10:02

## 2023-02-07 RX ADMIN — GABAPENTIN 300 MG: 300 CAPSULE ORAL at 09:02

## 2023-02-07 RX ADMIN — ALVIMOPAN 12 MG: 12 CAPSULE ORAL at 10:02

## 2023-02-07 RX ADMIN — MUPIROCIN 1 G: 20 OINTMENT TOPICAL at 10:02

## 2023-02-07 RX ADMIN — ROCURONIUM BROMIDE 10 MG: 10 INJECTION INTRAVENOUS at 04:02

## 2023-02-07 RX ADMIN — SUGAMMADEX 200 MG: 100 INJECTION, SOLUTION INTRAVENOUS at 05:02

## 2023-02-07 RX ADMIN — ONDANSETRON 4 MG: 2 INJECTION INTRAMUSCULAR; INTRAVENOUS at 04:02

## 2023-02-07 NOTE — OP NOTE
[facility] - [facility] (2nd Fl)    Procedure(s) (LRB):  HYSTERECTOMY, TOTAL, ABDOMINAL (N/A)  SALPINGECTOMY (Bilateral)  OOPHORECTOMY (Right)    DATE OF SURGERY  2/7/2023     Surgeon(s) and Role  Primary: Eros Friedman MD  Fellow: Alla Sykes MD     ANESTHESIA TYPE  General     PRE-OPERATIVE DIAGNOSIS  Colostomy in place [Z93.3]    POST-OPERATIVE DIAGNOSIS  Post-Op Diagnosis Codes:     * Colostomy in place [Z93.3]    FINDINGS:  20 week size uterus with multiple subserosal and pedunculated fibroids.  Normal tubes and ovaries.  The right ovary was completely replaced and could not be salvaged.      Procedure in Detail: When I entered the room the patient's abdomen was opened.  The abdomen and pelvis were explored and revealed the operative findings above. Intraperitoneal anatomy was normal and we proceeded with the assigned procedure..   The incision was extended to the level of the pubic symphysis.  The patient was placed in steep Trendelenburg, a Bookwalter retractor was inserted, and the small bowel was packed into the upper abdomen.  The right round ligament were divided, broad ligaments incised, and ureters identified. The right infundibulopelvic ligaments were isolated, clamped, cut, and ligated.  The same procedure was performed on the left side but the ovary was spared; the left uteroovarian ligament was divided, isolated, and transected with the LigaSure device.  The left fallopian tube was dissected off the ovary by ligating and transecting the mesosalpinx with the LigaSure device.  The bladder was dissected off the lower uterine segment, cervix, and upper 2 cm of the vagina. The cardinal ligaments were serially clamped, cut, and tied, being palpably free of the ureters, and a circumferential incision was made around the cervix. The uterus, cervix, bilateral tubes and ovaries were removed via the laparotomy and sent to Pathology. The vagina was closed in the usual two-layer fashion, securing the  uterosacral ligaments into the cuff closure.  Hemostasis was secured.  The case was transferred back to [facility].  Sponge, salt, needle, and instrument count was correct.  I was present and scrubbed for the entirety of the case.     ASSISTANT SURGEONS  Residents    UNUSUAL CIRCUMSTANCES  Yes. Morbid obesity more difficult to enter the abdomen, dock the robot, and perform the surgery in a safely manner.    CONDITION  chronic    POST-OP COMPLICATION  No    DIABETIC  No    ESTIMATED BLOOD LOSS  20 cc

## 2023-02-07 NOTE — OP NOTE
[facility]  Operative Note    Date: 02/07/2023    Pre-Op Diagnosis:   Patient Active Problem List    Diagnosis Date Noted    Anxiety 03/02/2022    COVID-19 03/02/2022    Diverticulitis of large intestine with perforation 03/02/2022    HTN (hypertension) 03/02/2022    Hypokalemia 03/02/2022    Obesity 03/02/2022    Perforation of sigmoid colon due to diverticulitis 03/02/2022    Persistent fatigue after COVID-19 03/02/2022        Post-Op Diagnosis: Same     Procedure(s) Performed:   1.  Cystoscopy with bilateral ureteral catheter placement    Specimen(s): none    Staff Surgeon: Brianna Bonilla MD    Assistant Surgeon: Pineda Houston MD    Anesthesia: General endotracheal anesthesia    Indications: Lizbeth Leonard is a 42 y.o. female with history of fibroids and diverticulitis c/b perforation.  Dr. Friedman has requested intra-operative ureteral catheters to allow for early intra-operative identification and repair of any injuries.      Findings:   -Bladder distortion from large uterine fibroid  -No masses or lesions present in the bladder  -Bilateral ureteral catheters placed without difficulty    Estimated Blood Loss: min    Drains:   1.  bilateral 5 Fr ureteral catheters  2.  16 Fr aguilar catheter    Procedure in Detail: Upon entering the room the patient was under general anesthesia.  The patient was then placed in the dorsal lithotomy position and prepped and draped in the usual sterile fashion. Preoperative antibiotics were administered per the primary surgeon preference.  Timeout was performed.      A 22 Fr cystoscope was inserted into the urethra and formal cystourethroscopy was performed. The urethra was normal.  The right and left ureteral orifices were in the normal anatomic position.  There were no mucosal abnormalities. Bladder distorted from posterior wall due to fibroids.  A 5 Fr ureteral catheter was then inserted into the left ureteral orifice and advanced up to the level of the renal  pelvis. The cystoscope was then removed leaving the ureteral catheter in place.     The cystoscope was then reinserted alongside the ureteral catheter and a 5 Fr ureteral catheter was advanced into the right ureteral orifice and advanced to the level of the right renal pelvis. The cystoscope was then removed, leaving the ureteral catheter in place.    A 16 Fr aguilar catheter was inserted and the balloon was filled with 10mL of sterile water. The ureteral catheters were secured to the Aguilar catheter in the standard fashion. There were no complications with the procedure and the patient tolerated our procedure well.     The case was then turned over to the primary surgeon.     MD NICANOR Faust was present for the entire case and agree with the above note.

## 2023-02-07 NOTE — H&P
H&P completed on 10/22 has been reviewed, the patient has been examined and:  I concur with the findings and no changes have occurred since H&P was written.    There are no hospital problems to display for this patient.     HISTORY OF PRESENT CONDITION       Chief Complaint   Patient presents with    Pelvic Mass       New patient (pelvic masses) fibroids       Lizbeth Leonard is a 42 y.o.  () who presents in consultation at for an opinion regarding a fibroid uterus. Regular monthly cycles 5 days in length, with heavy bleeding. No birth control on hormones.       PMHx: HTN and HLD.   Sx: only colostomy  Family Hx: denies any family history of breast, ovarian or colon cancers.      REVIEW OF SYSTEMS  All systems reviewed and negative except as noted in HPI.     OBJECTIVE       Vitals:     10/21/22 0940   BP: (!) 182/84   Pulse: 78      Body mass index is 49.46 kg/m².      1. General: Well appearing, no apparent distress, alert and oriented.  2. Lymph: Neck symmetric without cervical or supraclavicular adenopathy or mass.  3. Lungs: Normal respiratory rate, no accessory muscle use.  4. Psych: Normal affect.  5. Abdomen:  non-distended, soft, non-tender, are no masses, no ascites, no hepatosplenomegaly; RUQ colostomy with appliance; laparoscopic incisions  6. Skin: Warm, dry, no rashes or lesions.   7. Extremities: Bilateral lower extremities without edema or tenderness.  8. Genitourinary               Pelvic Examination including:                a. External genitalia are normal in appearance. No lesions noted.               b. Urethral meatus is normal size, location, and appearance.               c. Urethra is negative.               d. Bladder is nontender. No masses noted.               e. Vagina has normal mucosa with physiologic discharge. No lesions noted.              f. Uterus is full of fibroids and extending to above the umbilicus; normal mobility              g. Adnexa limited by body habitus               h. Rectum deferred     ECOG status: 2     LABORATORY DATA  Lab data reviewed.     RADIOLOGICAL DATA  Radiology data reviewed.     PATHOLOGY DATA  Pathology data reviewed.     ASSESSMENT / PLAN      1. Intramural, submucous, and subserous leiomyoma of uterus    2. Morbid obesity with body mass index of 50 or higher       F/U Pap smear     PSH:  1/23/2022: Laparoscopic sigmoid colectomy with end colostomy     I discussed with the patient that the primary treatment for a symptomatic fibroid uterus is observation versus surgical management.  Surgical management will depend on the  intraoperative findings and sometimes frozen pathology.  Differential diagnoses includes a fibroid uterus or a uterine cancer.  I will plan to perform this in a open fashion.  The procedure and its risks and benefits were discussed in detail.        PATIENT EDUCATION  Ready to learn, no apparent learning barriers were identified; learning preferences include listening.  Explained diagnosis and treatment plan; patient expressed understanding of the content.     INFORMED CONSENT  Discussed the risks, benefits, and alternatives of the procedure and of possible blood transfusion.  Discussed the necessity of other members of the healthcare team participating in the procedure.  All questions answered and consent given.     Dm Harris          Electronically signed by Dm Harris MD at 10/21/2022 10:59 AM

## 2023-02-07 NOTE — MEDICAL/APP STUDENT
Admission Dx: fibroid uterus  POD#0 s/p FAVIAN/RSO/L salpingectomy and colon reanastomosis EBL: ***         Pre H/H: 11.5/37.1 > ***  PMH: perforated diverticulitis s/p colectomy with end colostomy, HTN, HLD, anxiety  Meds: acetaminophen, ibuprofen, oxycodone, hydromorphone, metoprolol, rosuvastatin, (hold home amlodipine, escitalopram, alprazolam, clonidine)  Diet/Fluids: 40LR  Labs/Imaging: CBC AM  VTE ppx: Heparin PM  F/U:  [ ] ***

## 2023-02-07 NOTE — ANESTHESIA PREPROCEDURE EVALUATION
[facility]  Anesthesia Pre-Operative Evaluation         Patient Name: Lizbeth Leonard  YOB: 1980  MRN: [id number]    SUBJECTIVE:     Pre-operative evaluation for Procedure(s) (LRB):  CLOSURE, COLOSTOMY (N/A)  HYSTERECTOMY, TOTAL, ABDOMINAL, WITH BILATERAL SALPINGO-OOPHORECTOMY (N/A)     02/06/2023    Lizbeth Leonard is a 42 y.o. female w/ a significant PMHx of HTN, HLD, Anxiety, Obesity (BMI 47), perforated diverticulitis s/p robotic sigmoid colectomy with colostomy creation in 1/23/2022.    Patient now presents for the above procedure(s).      LDA: None documented.       Prev airway: None documented.    Drips: None documented.      Patient Active Problem List   Diagnosis    Anxiety    COVID-19    Diverticulitis of large intestine with perforation    HTN (hypertension)    Hypokalemia    Obesity    Perforation of sigmoid colon due to diverticulitis    Persistent fatigue after COVID-19       Review of patient's allergies indicates:   Allergen Reactions    Sulfa (sulfonamide antibiotics)        Current Inpatient Medications:      No current facility-administered medications on file prior to encounter.     Current Outpatient Medications on File Prior to Encounter   Medication Sig Dispense Refill    EScitalopram oxalate (LEXAPRO) 20 MG tablet Take 1 tablet (20 mg total) by mouth once daily. 90 tablet 1    metoprolol tartrate (LOPRESSOR) 50 MG tablet Take 50 mg by mouth once daily.      potassium chloride SA (K-DUR,KLOR-CON) 20 MEQ tablet Daily      rosuvastatin (CRESTOR) 40 MG Tab Take 1 tablet (40 mg total) by mouth every evening. 90 tablet 1    ALPRAZolam (XANAX) 0.5 MG tablet Take 1 tablet (0.5 mg total) by mouth 2 (two) times daily as needed for Anxiety. 30 tablet 1    amLODIPine (NORVASC) 10 MG tablet Take 1 tablet by mouth once daily 90 tablet 0    amLODIPine (NORVASC) 10 MG tablet Take 1 tablet (10 mg total) by mouth once daily. 90 tablet 1    benzonatate (TESSALON)  100 MG capsule Take 100 mg by mouth 3 (three) times daily as needed for Cough.      cloNIDine (CATAPRES) 0.2 MG tablet Take 1 tablet (0.2 mg total) by mouth 3 (three) times daily. 90 tablet 3    sod sulf-pot chloride-mag sulf (SUTAB) 1.479-0.188- 0.225 gram tablet Take 12 tablets by mouth once daily. Take as directed from provider. BIN: [id number], PCN: [id number], GROUP: [id number], MEMBER ID: [id number] 24 tablet 0       Past Surgical History:   Procedure Laterality Date    Robotic Sigmoid colectomy  01/23/2022    ... Creaction of end colostomy, drainage of intra abdominal abcess with washout, extensive lysis of dense adhesions       Social History     Socioeconomic History    Marital status:    Tobacco Use    Smoking status: Never    Smokeless tobacco: Never   Substance and Sexual Activity    Alcohol use: Not Currently    Drug use: Never    Sexual activity: Yes     Partners: Male       OBJECTIVE:     Vital Signs Range (Last 24H):  Temp:  [36.5 °C (97.7 °F)-36.6 °C (97.9 °F)]   Pulse:  [71-88]   Resp:  [16-18]   BP: (103-189)/()   SpO2:  [96 %-100 %]       Significant Labs:  Lab Results   Component Value Date    WBC 7.39 01/06/2023    HGB 11.5 (L) 01/06/2023    HCT 37.1 01/06/2023     (H) 01/06/2023    CHOL 183 01/06/2023    TRIG 58 01/06/2023    HDL 67 01/06/2023    AST 10 01/06/2023     01/06/2023    K 4.7 01/06/2023     01/06/2023    CREATININE 0.68 01/06/2023    BUN 7.5 01/06/2023    CO2 28 01/06/2023    TSH 1.48 03/02/2022    HGBA1C 5.7 03/02/2022       Diagnostic Studies: No relevant studies.    EKG:   No results found for this or any previous visit.    2D ECHO:  TTE:  No results found for this or any previous visit.    DARNELL:  No results found for this or any previous visit.    ASSESSMENT/PLAN:                                                                                                                 02/06/2023  Lizbeth Leonard is a 42 y.o., female.      Pre-op Assessment    I  have reviewed the Patient Summary Reports.     I have reviewed the Nursing Notes. I have reviewed the NPO Status.   I have reviewed the Medications.     Review of Systems  Anesthesia Hx:  No problems with previous Anesthesia  History of prior surgery of interest to airway management or planning: Previous anesthesia: General 1/2022 COLOSTOMY with general anesthesia.  Denies Family Hx of Anesthesia complications.   Denies Personal Hx of Anesthesia complications.   Social:  Non-Smoker, No Alcohol Use    Hematology/Oncology:  Hematology Normal   Oncology Normal     EENT/Dental:EENT/Dental Normal   Cardiovascular:   Exercise tolerance: good Hypertension Denies CAD.    Denies CABG/stent.   Denies Angina.  Denies CHF. hyperlipidemia  Functional Capacity good / => 4 METS    Pulmonary:   Denies COPD.  Denies Asthma.  Denies Shortness of breath.  Denies Recent URI.  Denies Sleep Apnea.    Renal/:  Renal/ Normal  Denies Chronic Renal Disease.     Hepatic/GI:   Denies GERD. COLOSTOMY IN PLACE. INCISION HERNIA.   Musculoskeletal:  Musculoskeletal Normal    Neurological:  Neurology Normal  Denies CVA.  Denies Headaches. Denies Seizures.    Endocrine:   Denies Diabetes.  Morbid Obesity / BMI > 40Denies Obesity / BMI > 30  Psych:   Psychiatric History anxiety             Anesthesia Plan  Type of Anesthesia, risks & benefits discussed:    Anesthesia Type: Gen ETT  Intra-op Monitoring Plan: Standard ASA Monitors and Art Line  Post Op Pain Control Plan: multimodal analgesia and IV/PO Opioids PRN  Induction:  IV  Airway Plan: Direct and Video, Post-Induction with ramp  Informed Consent: Informed consent signed with the Patient and all parties understand the risks and agree with anesthesia plan.  All questions answered. Patient consented to blood products? Yes  ASA Score: 3  Day of Surgery Review of History & Physical: H&P Update referred to the surgeon/provider.    Ready For Surgery From Anesthesia Perspective.     .

## 2023-02-07 NOTE — ANESTHESIA PROCEDURE NOTES
Intubation    Date/Time: 2/7/2023 11:05 AM  Performed by: Zenia Kumar MD  Authorized by: Koko Fang MD     Intubation:     Induction:  Intravenous    Intubated:  Postinduction    Mask Ventilation:  Easy mask    Attempts:  1    Attempted By:  Resident anesthesiologist    Method of Intubation:  Direct    Blade:  Pickering 2    Laryngeal View Grade: Grade IIA - cords partially seen      Difficult Airway Encountered?: No      Complications:  None    Airway Device:  Oral endotracheal tube    Airway Device Size:  7.0    Style/Cuff Inflation:  Cuffed (inflated to minimal occlusive pressure)    Tube secured:  22    Secured at:  The lips    Placement Verified By:  Capnometry    Complicating Factors:  None    Findings Post-Intubation:  BS equal bilateral and atraumatic/condition of teeth unchanged

## 2023-02-07 NOTE — PROGRESS NOTES
PreOp complete. Type and screen and ABO sent to blood bank. Belongings given to significant other, Jose

## 2023-02-07 NOTE — BRIEF OP NOTE
[facility] ([facility])  Brief Operative Note    SUMMARY     Surgery Date: 2/7/2023     Surgeon(s) and Role:  Panel 1:     * Eros Avery MD - Primary     * Alla Sykes MD - Fellow  Panel 2:     * Chavo Varela MD - Primary     * Gabe Munoz MD - Resident - Chief  Panel 3:     * Brianna Bonilla MD - Primary     * Pineda Houston MD - Resident - Assisting        Pre-op Diagnosis:  Colostomy in place [Z93.3]    Post-op Diagnosis:  Post-Op Diagnosis Codes:     * Colostomy in place [Z93.3]    Procedure(s) (LRB):  CLOSURE, COLOSTOMY (N/A)  HYSTERECTOMY, TOTAL, ABDOMINAL (N/A)  CATHETERIZATION, URETER (Bilateral)  SALPINGECTOMY (Bilateral)  OOPHORECTOMY (Right)  SIGMOIDOSCOPY, FLEXIBLE (N/A)  MOBILIZATION, SPLENIC FLEXURE  COLECTOMY, SIGMOID    Anesthesia: General    Operative Findings: descending colostomy; 25cm rectosigmoid stump, resected to top of rectum for end to end stapled anastomosis    Estimated Blood Loss: * No values recorded between 2/7/2023 11:20 AM and 2/7/2023  5:54 PM *    Estimated Blood Loss has been documented.         Specimens:   Specimen (24h ago, onward)       Start     Ordered    02/07/23 1623  Specimen to Pathology, Surgery General Surgery (Colon Rectal)  Once        Comments: Pre-op Diagnosis: Colostomy in place [Z93.3]Procedure(s):CLOSURE, COLOSTOMYHYSTERECTOMY, TOTAL, ABDOMINALCATHETERIZATION, URETERSALPINGECTOMYOOPHORECTOMY Number of specimens: 4Name of specimens: 1. Left tube - perm2. Uterus, cervix, right tube and ovary - perm3. Sigmoid colon - perm4. Proximal and distal anastomotic donuts - perm     References:    Click here for ordering Quick Tip   Question Answer Comment   Procedure Type: General Surgery Colon Rectal   Specimen Class: Routine/Screening    Which provider would you like to cc? EROS AVERY    Which provider would you like to cc? CHAVO VARELA    Release to patient Immediate        02/07/23 1626                    [id number]

## 2023-02-08 LAB
ANION GAP SERPL CALC-SCNC: 11 MMOL/L (ref 8–16)
ANISOCYTOSIS BLD QL SMEAR: SLIGHT
BASOPHILS # BLD AUTO: 0.02 K/UL (ref 0–0.2)
BASOPHILS NFR BLD: 0.1 % (ref 0–1.9)
BUN SERPL-MCNC: 6 MG/DL (ref 6–20)
CALCIUM SERPL-MCNC: 7.5 MG/DL (ref 8.7–10.5)
CHLORIDE SERPL-SCNC: 108 MMOL/L (ref 95–110)
CO2 SERPL-SCNC: 16 MMOL/L (ref 23–29)
CREAT SERPL-MCNC: 0.6 MG/DL (ref 0.5–1.4)
DIFFERENTIAL METHOD: ABNORMAL
EOSINOPHIL # BLD AUTO: 0 K/UL (ref 0–0.5)
EOSINOPHIL NFR BLD: 0 % (ref 0–8)
ERYTHROCYTE [DISTWIDTH] IN BLOOD BY AUTOMATED COUNT: 16.7 % (ref 11.5–14.5)
EST. GFR  (NO RACE VARIABLE): >60 ML/MIN/1.73 M^2
GLUCOSE SERPL-MCNC: 128 MG/DL (ref 70–110)
HCT VFR BLD AUTO: 42.4 % (ref 37–48.5)
HGB BLD-MCNC: 13.2 G/DL (ref 12–16)
IMM GRANULOCYTES # BLD AUTO: 0.04 K/UL (ref 0–0.04)
IMM GRANULOCYTES NFR BLD AUTO: 0.3 % (ref 0–0.5)
LYMPHOCYTES # BLD AUTO: 0.7 K/UL (ref 1–4.8)
LYMPHOCYTES NFR BLD: 4.7 % (ref 18–48)
MAGNESIUM SERPL-MCNC: 2 MG/DL (ref 1.6–2.6)
MAGNESIUM SERPL-MCNC: 7.7 MG/DL (ref 1.6–2.6)
MCH RBC QN AUTO: 25.7 PG (ref 27–31)
MCHC RBC AUTO-ENTMCNC: 31.1 G/DL (ref 32–36)
MCV RBC AUTO: 83 FL (ref 82–98)
MONOCYTES # BLD AUTO: 1 K/UL (ref 0.3–1)
MONOCYTES NFR BLD: 7 % (ref 4–15)
NEUTROPHILS # BLD AUTO: 12.5 K/UL (ref 1.8–7.7)
NEUTROPHILS NFR BLD: 87.9 % (ref 38–73)
NRBC BLD-RTO: 0 /100 WBC
OVALOCYTES BLD QL SMEAR: ABNORMAL
PHOSPHATE SERPL-MCNC: 1.9 MG/DL (ref 2.7–4.5)
PLATELET # BLD AUTO: 472 K/UL (ref 150–450)
PLATELET BLD QL SMEAR: ABNORMAL
PMV BLD AUTO: 9.9 FL (ref 9.2–12.9)
POCT GLUCOSE: 120 MG/DL (ref 70–110)
POIKILOCYTOSIS BLD QL SMEAR: SLIGHT
POTASSIUM SERPL-SCNC: 3.5 MMOL/L (ref 3.5–5.1)
RBC # BLD AUTO: 5.14 M/UL (ref 4–5.4)
SODIUM SERPL-SCNC: 135 MMOL/L (ref 136–145)
WBC # BLD AUTO: 14.19 K/UL (ref 3.9–12.7)

## 2023-02-08 PROCEDURE — 85025 COMPLETE CBC W/AUTO DIFF WBC: CPT | Performed by: STUDENT IN AN ORGANIZED HEALTH CARE EDUCATION/TRAINING PROGRAM

## 2023-02-08 PROCEDURE — 80048 BASIC METABOLIC PNL TOTAL CA: CPT | Performed by: STUDENT IN AN ORGANIZED HEALTH CARE EDUCATION/TRAINING PROGRAM

## 2023-02-08 PROCEDURE — 63600175 PHARM REV CODE 636 W HCPCS

## 2023-02-08 PROCEDURE — 25000003 PHARM REV CODE 250

## 2023-02-08 PROCEDURE — 97530 THERAPEUTIC ACTIVITIES: CPT

## 2023-02-08 PROCEDURE — 94761 N-INVAS EAR/PLS OXIMETRY MLT: CPT

## 2023-02-08 PROCEDURE — 83735 ASSAY OF MAGNESIUM: CPT | Mod: 91 | Performed by: COLON & RECTAL SURGERY

## 2023-02-08 PROCEDURE — 36415 COLL VENOUS BLD VENIPUNCTURE: CPT | Performed by: STUDENT IN AN ORGANIZED HEALTH CARE EDUCATION/TRAINING PROGRAM

## 2023-02-08 PROCEDURE — 99024 PR POST-OP FOLLOW-UP VISIT: ICD-10-PCS | Mod: ,,, | Performed by: OBSTETRICS & GYNECOLOGY

## 2023-02-08 PROCEDURE — 83735 ASSAY OF MAGNESIUM: CPT | Performed by: STUDENT IN AN ORGANIZED HEALTH CARE EDUCATION/TRAINING PROGRAM

## 2023-02-08 PROCEDURE — 20600001 HC STEP DOWN PRIVATE ROOM

## 2023-02-08 PROCEDURE — 36415 COLL VENOUS BLD VENIPUNCTURE: CPT | Performed by: COLON & RECTAL SURGERY

## 2023-02-08 PROCEDURE — 27100080 HC AIRWAY ADAPTER-END TIDAL CO2

## 2023-02-08 PROCEDURE — 99900035 HC TECH TIME PER 15 MIN (STAT)

## 2023-02-08 PROCEDURE — 84100 ASSAY OF PHOSPHORUS: CPT | Performed by: STUDENT IN AN ORGANIZED HEALTH CARE EDUCATION/TRAINING PROGRAM

## 2023-02-08 PROCEDURE — 63600175 PHARM REV CODE 636 W HCPCS: Performed by: STUDENT IN AN ORGANIZED HEALTH CARE EDUCATION/TRAINING PROGRAM

## 2023-02-08 PROCEDURE — 63600175 PHARM REV CODE 636 W HCPCS: Performed by: COLON & RECTAL SURGERY

## 2023-02-08 PROCEDURE — 97116 GAIT TRAINING THERAPY: CPT

## 2023-02-08 PROCEDURE — 25000003 PHARM REV CODE 250: Performed by: STUDENT IN AN ORGANIZED HEALTH CARE EDUCATION/TRAINING PROGRAM

## 2023-02-08 PROCEDURE — 97161 PT EVAL LOW COMPLEX 20 MIN: CPT

## 2023-02-08 PROCEDURE — 99024 POSTOP FOLLOW-UP VISIT: CPT | Mod: ,,, | Performed by: OBSTETRICS & GYNECOLOGY

## 2023-02-08 RX ORDER — HYDROMORPHONE HCL IN 0.9% NACL 6 MG/30 ML
PATIENT CONTROLLED ANALGESIA SYRINGE INTRAVENOUS CONTINUOUS
Status: DISCONTINUED | OUTPATIENT
Start: 2023-02-08 | End: 2023-02-09

## 2023-02-08 RX ORDER — ENOXAPARIN SODIUM 100 MG/ML
40 INJECTION SUBCUTANEOUS EVERY 12 HOURS
Status: DISCONTINUED | OUTPATIENT
Start: 2023-02-08 | End: 2023-02-11 | Stop reason: HOSPADM

## 2023-02-08 RX ORDER — AMLODIPINE BESYLATE 10 MG/1
10 TABLET ORAL DAILY
Status: DISCONTINUED | OUTPATIENT
Start: 2023-02-08 | End: 2023-02-11 | Stop reason: HOSPADM

## 2023-02-08 RX ORDER — NALOXONE HCL 0.4 MG/ML
0.02 VIAL (ML) INJECTION
Status: DISCONTINUED | OUTPATIENT
Start: 2023-02-08 | End: 2023-02-09

## 2023-02-08 RX ORDER — ENOXAPARIN SODIUM 100 MG/ML
40 INJECTION SUBCUTANEOUS EVERY 24 HOURS
Status: DISCONTINUED | OUTPATIENT
Start: 2023-02-08 | End: 2023-02-08

## 2023-02-08 RX ORDER — CLONIDINE HYDROCHLORIDE 0.1 MG/1
0.2 TABLET ORAL 3 TIMES DAILY
Status: DISCONTINUED | OUTPATIENT
Start: 2023-02-08 | End: 2023-02-10

## 2023-02-08 RX ADMIN — ALPRAZOLAM 0.5 MG: 0.5 TABLET ORAL at 09:02

## 2023-02-08 RX ADMIN — GABAPENTIN 300 MG: 300 CAPSULE ORAL at 08:02

## 2023-02-08 RX ADMIN — MAGNESIUM SULFATE 2 G: 2 INJECTION INTRAVENOUS at 12:02

## 2023-02-08 RX ADMIN — ACETAMINOPHEN 1000 MG: 10 INJECTION INTRAVENOUS at 09:02

## 2023-02-08 RX ADMIN — ALPRAZOLAM 0.5 MG: 0.5 TABLET ORAL at 08:02

## 2023-02-08 RX ADMIN — GABAPENTIN 300 MG: 300 CAPSULE ORAL at 02:02

## 2023-02-08 RX ADMIN — ESCITALOPRAM OXALATE 20 MG: 20 TABLET ORAL at 08:02

## 2023-02-08 RX ADMIN — OXYCODONE 5 MG: 5 TABLET ORAL at 08:02

## 2023-02-08 RX ADMIN — ACETAMINOPHEN 1000 MG: 10 INJECTION INTRAVENOUS at 05:02

## 2023-02-08 RX ADMIN — ACETAMINOPHEN 1000 MG: 10 INJECTION INTRAVENOUS at 02:02

## 2023-02-08 RX ADMIN — METOPROLOL TARTRATE 50 MG: 50 TABLET, FILM COATED ORAL at 09:02

## 2023-02-08 RX ADMIN — ENOXAPARIN SODIUM 40 MG: 40 INJECTION SUBCUTANEOUS at 09:02

## 2023-02-08 RX ADMIN — IBUPROFEN 800 MG: 400 TABLET ORAL at 09:02

## 2023-02-08 RX ADMIN — ENOXAPARIN SODIUM 40 MG: 40 INJECTION SUBCUTANEOUS at 12:02

## 2023-02-08 RX ADMIN — GABAPENTIN 300 MG: 300 CAPSULE ORAL at 09:02

## 2023-02-08 RX ADMIN — METOPROLOL TARTRATE 50 MG: 50 TABLET, FILM COATED ORAL at 08:02

## 2023-02-08 RX ADMIN — ALVIMOPAN 12 MG: 12 CAPSULE ORAL at 12:02

## 2023-02-08 RX ADMIN — POTASSIUM BICARBONATE 40 MEQ: 391 TABLET, EFFERVESCENT ORAL at 12:02

## 2023-02-08 RX ADMIN — ALVIMOPAN 12 MG: 12 CAPSULE ORAL at 08:02

## 2023-02-08 RX ADMIN — MUPIROCIN: 20 OINTMENT TOPICAL at 08:02

## 2023-02-08 RX ADMIN — IBUPROFEN 800 MG: 800 INJECTION INTRAVENOUS at 02:02

## 2023-02-08 RX ADMIN — ATORVASTATIN CALCIUM 80 MG: 40 TABLET, FILM COATED ORAL at 08:02

## 2023-02-08 RX ADMIN — OXYCODONE HYDROCHLORIDE 10 MG: 10 TABLET ORAL at 02:02

## 2023-02-08 RX ADMIN — TRAMADOL HYDROCHLORIDE 50 MG: 50 TABLET, COATED ORAL at 08:02

## 2023-02-08 RX ADMIN — ALVIMOPAN 12 MG: 12 CAPSULE ORAL at 09:02

## 2023-02-08 RX ADMIN — CLONIDINE HYDROCHLORIDE 0.2 MG: 0.1 TABLET ORAL at 02:02

## 2023-02-08 RX ADMIN — MUPIROCIN: 20 OINTMENT TOPICAL at 09:02

## 2023-02-08 RX ADMIN — CLONIDINE HYDROCHLORIDE 0.2 MG: 0.1 TABLET ORAL at 09:02

## 2023-02-08 RX ADMIN — OXYCODONE HYDROCHLORIDE 10 MG: 10 TABLET ORAL at 07:02

## 2023-02-08 RX ADMIN — AMLODIPINE BESYLATE 10 MG: 10 TABLET ORAL at 09:02

## 2023-02-08 RX ADMIN — IBUPROFEN 800 MG: 800 INJECTION INTRAVENOUS at 05:02

## 2023-02-08 RX ADMIN — Medication: at 10:02

## 2023-02-08 NOTE — PROGRESS NOTES
Patient Name: Lizbeth Leonard  Date: 2/8/2023  Service: Colon and Rectal Surgery    Subjective:  Patient tachycardic overnight, sinus on EKG. Hypertensive, excellent urine output. Labs without acute abnormality. Pain poorly controlled on standard oxycodone regimen. Tolerated small volume diet without nausea/vomiting. Has not yet ambulated; using IS. [place] light red. Denies fevers/chills/sweats, lightheadedness/dizziness, chest pain/shortness of breath, upper or lower extremity edema/pain.     Medications:    Current Facility-Administered Medications:     0.9%  NaCl infusion, , Intravenous, Continuous, Alla Sykes MD    acetaminophen 1,000 mg/100 mL (10 mg/mL) injection 1,000 mg, 1,000 mg, Intravenous, Q8H, Stopped at 02/08/23 0606 **FOLLOWED BY** [START ON 2/9/2023] acetaminophen tablet 1,000 mg, 1,000 mg, Oral, Q8H, Alla Sykes MD    ALPRAZolam tablet 0.5 mg, 0.5 mg, Oral, BID PRN, Alla Sykes MD, 0.5 mg at 02/08/23 0821    alvimopan capsule 12 mg, 12 mg, Oral, BID, Alla Sykes MD, 12 mg at 02/08/23 0821    amLODIPine tablet 10 mg, 10 mg, Oral, Daily, Alla Sykes MD, 10 mg at 02/08/23 0915    atorvastatin tablet 80 mg, 80 mg, Oral, Daily, Alla Sykes MD, 80 mg at 02/08/23 0822    cloNIDine tablet 0.2 mg, 0.2 mg, Oral, TID, Alla Sykes MD, 0.2 mg at 02/08/23 0915    EScitalopram oxalate tablet 20 mg, 20 mg, Oral, Daily, Alla Sykes MD, 20 mg at 02/08/23 0822    gabapentin capsule 300 mg, 300 mg, Oral, TID, Alla Sykes MD, 300 mg at 02/08/23 0823    HYDROmorphone injection 0.2 mg, 0.2 mg, Intravenous, Q5 Min PRN, Felton Long MD, 0.2 mg at 02/07/23 2105    HYDROmorphone PCA syringe 6 mg/30 mL (0.2 mg/mL) NS, , Intravenous, Continuous, Alla Sykes MD    ibuprofen 800 mg in dextrose 5 % 250 mL (Vial-Mate), 800 mg, Intravenous, Q8H, 800 mg at 02/08/23 0544 **FOLLOWED BY** ibuprofen tablet 800 mg, 800 mg, Oral, Q8H, Alla Sykes MD    metoprolol tartrate (LOPRESSOR)  tablet 50 mg, 50 mg, Oral, BID, Alla Sykes MD, 50 mg at 02/08/23 0823    mupirocin 2 % ointment, , Nasal, BID, Alla Sykes MD, Given at 02/08/23 0851    naloxone 0.4 mg/mL injection 0.02 mg, 0.02 mg, Intravenous, PRN, Alla Sykes MD    ondansetron injection 4 mg, 4 mg, Intravenous, Q12H PRN, Alla Sykes MD, 4 mg at 02/07/23 1934    sodium chloride 0.9% flush 10 mL, 10 mL, Intravenous, PRN, Felton Long MD    sodium chloride 0.9% flush 10 mL, 10 mL, Intra-Catheter, PRN, Alla Sykes MD    traMADoL tablet 50 mg, 50 mg, Oral, Q6H PRN, Alla Sykes MD, 50 mg at 02/08/23 0824    Vital Signs:  Vitals:    02/08/23 0824   BP:    Pulse:    Resp: 20   Temp:            Physical Exam:  General: Alert, oriented, in no apparent distress  HEENT: Sclera anicteric, trachea midline  Lungs: Normal respiratory rate and effort on room air  Abdomen: Soft, nontender, nondistended. Prevena clean/dry/intact without erythema or drainage.  Extremities: Warm, well perfused, no edema, SCDs in place  Neuro: Grossly intact, moves all extremities  Psych: Appropriate affect    Laboratory Studies:    Complete Blood Count:  Lab Results   Component Value Date/Time    WBC 14.19 (H) 02/08/2023 02:36 AM    WBC 7.39 01/06/2023 08:07 AM    HGB 13.2 02/08/2023 02:36 AM    HCT 42.4 02/08/2023 02:36 AM    RBC 5.14 02/08/2023 02:36 AM     (H) 02/08/2023 02:36 AM       Basic Chemistry Panel:  Lab Results   Component Value Date/Time     (L) 02/08/2023 02:36 AM     01/06/2023 08:07 AM    K 3.5 02/08/2023 02:36 AM    K 4.7 01/06/2023 08:07 AM     02/08/2023 02:36 AM     01/06/2023 08:07 AM    CO2 16 (L) 02/08/2023 02:36 AM    CO2 28 01/06/2023 08:07 AM    BUN 6 02/08/2023 02:36 AM    BUN 7.5 01/06/2023 08:07 AM    CREATININE 0.6 02/08/2023 02:36 AM    CREATININE 0.68 01/06/2023 08:07 AM    CALCIUM 7.5 (L) 02/08/2023 02:36 AM    CALCIUM 9.3 01/06/2023 08:07 AM       Hepatic Panel:  Lab Results   Component  Value Date/Time    AST 12 02/07/2023 09:09 PM    AST 10 01/06/2023 08:07 AM    ALT 6 (L) 02/07/2023 09:09 PM    ALKPHOS 55 02/07/2023 09:09 PM    BILITOT 1.2 (H) 02/07/2023 09:09 PM    PROT 7.5 02/07/2023 09:09 PM       Coagulation Panel:  No results found for: PT, INR, PTT    Imaging Studies:  none    Assessment:  Lizbeth Leonard is a 42 y.o. year old female S/p open colostomy reversal, hysterectomy, doing well     Continue multimodality pain control - change to PCA for 24 hours  Advance to a regular diet  Stop IV fluids  Initiate DVT ppx  Remove aguilar       Plan:    Patient discussed with attending, Dr. Friedman.    Alla Sykes MD  Colon and Rectal Surgery Fellow

## 2023-02-08 NOTE — NURSING TRANSFER
Nursing Transfer Note      2/7/2023     Reason patient is being transferred: post procedure    Transfer To: 1055    Transfer via bed    Transfer with cardiac monitoring, IV pole/pump/fluid    Transported by transport personnel    Medicines sent: atorvastatin tab, mupirocin    Any special needs or follow-up needed: routine    Chart send with patient: Yes    Notified: family via surgical texting system    Patient reassessed at: 2/7/2023 at  2230

## 2023-02-08 NOTE — PT/OT/SLP EVAL
"Physical Therapy Evaluation    Patient Name:  Lizbeth Leonard   MRN:  [id number]    Recommendations:     Discharge Recommendations: home   Discharge Equipment Recommendations: none   Barriers to discharge: None    Assessment:     Lizbeth Leonard is a 42 y.o. female admitted with a medical diagnosis of Colostomy in place.  She presents with the following impairments/functional limitations: impaired endurance, impaired functional mobility, gait instability, impaired balance. Pt was receptive and tolerated physical therapy evaluation well. Pt able to amb 450 feet in hallway with RW and SBA to supervision. Pt would benefit from acute skilled physical therapy services for improvement in functional mobility and incorporate stair trials for safe return home with no needs..    Rehab Prognosis: Good; patient would benefit from acute skilled PT services to address these deficits and reach maximum level of function.    Recent Surgery: Procedure(s) (LRB):  CLOSURE, COLOSTOMY (N/A)  HYSTERECTOMY, TOTAL, ABDOMINAL (N/A)  CATHETERIZATION, URETER (Bilateral)  SALPINGECTOMY (Bilateral)  OOPHORECTOMY (Right)  SIGMOIDOSCOPY, FLEXIBLE (N/A)  MOBILIZATION, SPLENIC FLEXURE  COLECTOMY, SIGMOID 1 Day Post-Op    Plan:     During this hospitalization, patient to be seen 3 x/week to address the identified rehab impairments via gait training, therapeutic activities, therapeutic exercises and progress toward the following goals:    Plan of Care Expires:  03/10/23    Subjective     Chief Complaint: none reported  Patient/Family Comments/goals: "Been waiting to get out of that bed"  Pain/Comfort:  Pain Rating 1: 0/10  Pain Rating Post-Intervention 1: 0/10    Patients cultural, spiritual, Restorationist conflicts given the current situation: no    Living Environment:  Pt live with fiance and son in [facility] with 3 MAURICIO with a L HR.  Prior to admission, patients level of function was independent.  Equipment used at home: bedside commode, rollator, shower chair. " Upon discharge, patient will have assistance from ance.    Objective:     Communicated with nurse prior to session.  Patient found HOB elevated with wound vac, peripheral IV  upon PT entry to room.    General Precautions: Standard, fall  Orthopedic Precautions:N/A   Braces: N/A  Respiratory Status: Room air    Exams:  RLE ROM: WFL  RLE Strength: WFL  LLE ROM: WFL  LLE Strength: WFL    Functional Mobility:  Bed Mobility:     Rolling Right: stand by assistance  Supine to Sit: stand by assistance  Transfers:     Sit to Stand:  stand by assistance with no AD  Toilet Transfer: stand by assistance from standing to sitting with  no AD  using  Stand Pivot  Toilet Transfer siting to standing: Independent with no AD  Gait: 10' to bathroom with SBA and no AD. 450' out in hallway with RW and SBA to supervision.  Pt amb with decreased antelmo without LOB  Balance: Static/dynamic sitting balance: good, pt able to sit EOB with supervision  Static standing balance: good, pt able to stand without AD at sink to wash hands  Dynamic standing balance: fair +, pt used RW for mobility for confidence and stability.      AM-PAC 6 CLICK MOBILITY  Total Score:17       Treatment & Education:  Discussed POC with pt who verbalized understanding. Educated pt on sitting upright in chair for optimal lung expansion. Educated pt on performing B LE exercises while sitting up in chair.    Patient left up in chair with all lines intact and call button in reach.    GOALS:   Multidisciplinary Problems       Physical Therapy Goals          Problem: Physical Therapy    Goal Priority Disciplines Outcome Goal Variances Interventions   Physical Therapy Goal     PT, PT/OT Ongoing, Progressing     Description: Goals to be met by: 2023     Patient will increase functional independence with mobility by performin. Supine to sit with Set-up [place]  2. Sit to stand transfer with Supervision  3. Gait  x 500 feet with Supervision using LRAD.   4.  Ascend/descend 3 stairs with left Handrails Supervision using No Assistive Device.                          History:     Past Medical History:   Diagnosis Date    HLD (hyperlipidemia)     HTN (hypertension)     Mental disorder        Past Surgical History:   Procedure Laterality Date    CATHETERIZATION OF URETER Bilateral 2/7/2023    Procedure: CATHETERIZATION, URETER;  Surgeon: Brianna Bonilla MD;  [facility];  Service: Urology;  Laterality: Bilateral;    CLOSURE, COLOSTOMY N/A 2/7/2023    Procedure: CLOSURE, COLOSTOMY;  Surgeon: Eros Friedman MD;  [facility];  Service: Colon and Rectal;  Laterality: N/A;    COLECTOMY, SIGMOID  2/7/2023    Procedure: COLECTOMY, SIGMOID;  Surgeon: Eros Friedman MD;  [facility];  Service: Colon and Rectal;;    COLONOSCOPY N/A 2/6/2023    Procedure: COLONOSCOPY;  Surgeon: Eros Friedman MD;  Location: [facility] (4TH FLR);  Service: Endoscopy;  Laterality: N/A;  Patient needs to be scheduled for a colonoscopy on 2/6 with Dr Friedman in preparation for surgery. Patient's previous colonoscopy was cancelled due to illness, please let me know if new order is needed, thanks!   sutab, prep instr portal -ml  BMI 48.3  1/12 pt reschedule    FLEXIBLE SIGMOIDOSCOPY N/A 2/7/2023    Procedure: SIGMOIDOSCOPY, FLEXIBLE;  Surgeon: Eros Friedman MD;  [facility];  Service: Colon and Rectal;  Laterality: N/A;    MOBILIZATION OF SPLENIC FLEXURE  2/7/2023    Procedure: MOBILIZATION, SPLENIC FLEXURE;  Surgeon: Eros Friedman MD;  [facility];  Service: Colon and Rectal;;    OOPHORECTOMY Right 2/7/2023    Procedure: OOPHORECTOMY;  Surgeon: Dm Harris MD;  [facility];  Service: Oncology;  Laterality: Right;    Robotic Sigmoid colectomy  01/23/2022    ... Creaction of end colostomy, drainage of intra abdominal abcess with washout, extensive lysis of dense adhesions    SALPINGECTOMY  Bilateral 2/7/2023    Procedure: SALPINGECTOMY;  Surgeon: Dm Harris MD;  Location: [facility] OR [address] [place] [address];  Service: Oncology;  Laterality: Bilateral;    TOTAL ABDOMINAL HYSTERECTOMY N/A 2/7/2023    Procedure: HYSTERECTOMY, TOTAL, ABDOMINAL;  Surgeon: Dm Harris MD;  Location: [facility] OR [address] [place] [address];  Service: Oncology;  Laterality: N/A;       Time Tracking:     PT Received On: 02/08/23  PT Start Time: 1400     PT Stop Time: 1437  PT Total Time (min): 37 min     Billable Minutes: Evaluation 10, Gait Training 17, and Therapeutic Activity 10      02/08/2023

## 2023-02-08 NOTE — PROGRESS NOTES
Pharmacist Renal Dose Adjustment Note    Lizbeth Leonard is a 42 y.o. female being treated with the medication Enoxaparin (Lovenox)    Patient Data:    Vital Signs (Most Recent):  Temp: 98.8 °F (37.1 °C) (02/08/23 0743)  Pulse: (!) 127 (02/08/23 0520)  Resp: 18 (02/08/23 1044)  BP: (!) 186/107 (02/08/23 0743)  SpO2: 96 % (02/08/23 0743)   Vital Signs (72h Range):  Temp:  [97.3 °F (36.3 °C)-98.8 °F (37.1 °C)]   Pulse:  []   Resp:  [12-20]   BP: (103-189)/()   SpO2:  [95 %-100 %]      Recent Labs   Lab 02/07/23 2109 02/08/23  0236   CREATININE 0.9 0.6     Serum creatinine: 0.6 mg/dL 02/08/23 0236  Estimated creatinine clearance: 165.7 mL/min  BMI 48.26 kg/m2    Medication: Lovenox 40 mg subQ Q24h will be changed to Lovenox 40 mg subQ Q12h for BMI > 40 kg/m2 per pharmacy protocol.    Pharmacist's Name: Kenna Hatch, PharmD  Pharmacist's Extension: [phone]

## 2023-02-08 NOTE — PROGRESS NOTES
Labs were sent and results look good, ECG and Xray done. MD ordered IV fluid 1L bolus. In house resident is okay to send patient to the room. Pt's vital signs are stable although remains tachycardic.

## 2023-02-08 NOTE — ANESTHESIA POSTPROCEDURE EVALUATION
Anesthesia Post Evaluation    Patient: Lizbeth Leonard    Procedure(s) Performed: Procedure(s) (LRB):  CLOSURE, COLOSTOMY (N/A)  HYSTERECTOMY, TOTAL, ABDOMINAL (N/A)  CATHETERIZATION, URETER (Bilateral)  SALPINGECTOMY (Bilateral)  OOPHORECTOMY (Right)  SIGMOIDOSCOPY, FLEXIBLE (N/A)  MOBILIZATION, SPLENIC FLEXURE  COLECTOMY, SIGMOID    Final Anesthesia Type: general      Patient location during evaluation: PACU  Patient participation: Yes- Able to Participate  Level of consciousness: awake and alert  Post-procedure vital signs: reviewed and stable  Pain management: adequate  Airway patency: patent    PONV status at discharge: No PONV  Anesthetic complications: no      Cardiovascular status: stable  Respiratory status: unassisted and spontaneous ventilation  Hydration status: euvolemic  Follow-up not needed.          Vitals Value Taken Time   /107 02/08/23 0743   Temp 37.1 °C (98.8 °F) 02/08/23 0743   Pulse 127 02/08/23 0520   Resp 20 02/08/23 0824   SpO2 96 % 02/08/23 0743         Event Time   Out of Recovery 19:30:00         Pain/Alisa Score: Pain Rating Prior to Med Admin: 5 (2/8/2023  8:24 AM)  Pain Rating Post Med Admin: 7 (2/8/2023  6:34 AM)  Alisa Score: 9 (2/7/2023  7:30 PM)

## 2023-02-08 NOTE — OP NOTE
MARCO LEONARD  [id number]  [id number]    OPERATIVE NOTE:    DATE OF OPERATION: 02/07/2023    PREOPERATIVE DIAGNOSIS: Unwanted colostomy    POSTOPERATIVE DIAGNOSIS: Unwanted colostomy    PROCEDURE PERFORMED: 1) Colostomy takedown with mobilization of splenic flexure, sigmoid colectomy, and colorectal anastomosis. 2) Flexible sigmoidoscopy     ATTENDING SURGEON: Eros Friedman MD    RESIDENT/FELLOW SURGEON: Arnaldo Sykes MD    ANESTHESIA: GETA    ESTIMATED BLOOD LOSS: 200 mL    FINDINGS:  1. Markedly enlarged uterus    SPECIMENS:   Sigmoid colon 2. Anastomotic rings (donuts)    COMPLICATIONS:None    DISPOSITION: PACU    CONDITION: good    INDICATION FOR PROCEDURE:  Marco Leonard is a 42 y.o. female who underwent Glez's procedure last year in [place].  She presents for colostomy closure and hysterectomy    DESCRIPTION OF PROCEDURE:   After obtaining informed consent the patient was taken the operating room and placed in the supine position.  She underwent general endotracheal anesthesia and then was placed lithotomy.  Ureteral catheters were placed- that is dictated in a separate note by Urology.  She was then padded and secured appropriately for open abdominal surgery.  She was prepped draped sterile manner.  A preoperative time-out was performed.    Her right upper quadrant colostomy was taken down by incising at the mucocutaneous junction and taking the dissection down through the abdominal wall fat to the underlying muscle.  The majority of the ostomy was sharply dissected off of the muscle and fascia.  At this point we could palpate the midline which was without significant adhesions.  Midline incision was made from the midepigastric region down to the symphysis pubis.  The remaining attachments of the colostomy to the internal opening  of the trephine were then divided sharply.  The colon was mobilized from the descending colon where the ostomy was to the mid transverse colon which included  complete mobilization of splenic flexure.  At this point the case was turned over to Dr. Harris from Gynecologic Oncology for the hysterectomy.  We then resumed our portion of the procedure when he was completed     The remaining sigmoid colon was  mobilized by entering into the retroperitoneal presacral plane.  The mesorectum in the proximal rectum was divided using a LigaSure.  As was the superior rectal artery near the sacral promontory.   The rectum was then transected with a contour stapler.  The descending colon was brought down for the anastomosis and transected proximally 6 cm from its stapled end.  This was to exclude any bowel which was involved with adhesions in the intra-abdominal portion of the colostomy.  The blood supply of this portion of colon was inspected by transecting the mesenteric fat.  There was good pulsatile flow in this area.    A standard stapled end-to-end anastomosis was performed with a 29 mm circular stapler.  The anastomotic rings were intact.  The anastomosis was oversewn on its anterior aspect with interrupted 3-0 Vicryl.  Sigmoidoscope was then passed up to the anastomosis the staple line was inspected and there was no active bleeding, the mucosa of both portions of the pre and post anastomotic colon appeared viable.  Leak test was negative.    Hemostasis was ensured  and the abdomen was irrigated clear.  The left upper quadrant ostomy site was closed in 2 layers with 1. PDS in a running fashion.  Midline fascia was closed with 1. PDS.  Sponge, needle, and instrument counts were reported correct.  The midline fascia was irrigated to clear and then the midline  skin was closed with subdermal interrupted 3-0 Vicryl.  A Prevena VAC was placed.   The left upper quadrant ostomy site was closed partially with subcuticular 4 Monocryl and then packed with gauze.  A sterile dressing was applied.  The patient tolerated procedure well was taken recovery room stable condition.      Eros MARTINES  MD [place]  , Colon & Rectal Surgery

## 2023-02-08 NOTE — PROGRESS NOTES
Progress Note  Gyn Onc    Admit Date: 2023  LOS: 1    Reason for Admission:  Colostomy in place    SUBJECTIVE:     Lizbeth Leonard is a 42 y.o.  who is 1 Day Post-Op  s/p FAVIAN/BS/RO and reanastamosis of colostomy.  Pt doing well this morning. Pain is poorly-moderately controlled. She is tolerating clears without n/v. Has not attempted ambulation. Voiding via aguilar. Not passing flatus. She is not yet having bowel movements.    OBJECTIVE:     Vital Signs   Temp:  [97.3 °F (36.3 °C)-98.8 °F (37.1 °C)] 98.8 °F (37.1 °C)  Pulse:  [] 127  Resp:  [12-20] 20  SpO2:  [95 %-100 %] 96 %  BP: (135-186)/() 186/107      Intake/Output Summary (Last 24 hours) at 2023 0843  Last data filed at 2023 0842  Gross per 24 hour   Intake 3632.47 ml   Output 2630 ml   Net 1002.47 ml       Physical Exam:  Gen: A&Ox3, NAD  CV: Regular rate  Pulm: Normal respiratory effort  Abd: soft, non-distended, appropriately tender to palpation without rebound or guarding  Inc: clean, dry, intact   Ext: No peripheral edema, TEDs/SCDs in place   : Aguilar in place draining bloody urine without clots    Laboratory:  Lab Results   Component Value Date    WBC 14.19 (H) 2023    HGB 13.2 2023    HCT 42.4 2023    MCV 83 2023     (H) 2023         BMP  Lab Results   Component Value Date     (L) 2023    K 3.5 2023     2023    CO2 16 (L) 2023    BUN 6 2023    CREATININE 0.6 2023    CALCIUM 7.5 (L) 2023    ANIONGAP 11 2023    EGFRNORACEVR >60.0 2023     Lab Results   Component Value Date    ALT 6 (L) 2023    AST 12 2023    ALKPHOS 55 2023    BILITOT 1.2 (H) 2023         ASSESSMENT/PLAN:     Active Hospital Problems    Diagnosis  POA    *Colostomy in place [Z93.3]  Not Applicable      Resolved Hospital Problems   No resolved problems to display.       Assessment: 42 y.o.  post op day 1    Plan:   1. Post-op care  per primary team  - Blood tinged urine likely from stent placement, given appropriate mobilization of bladder during hysterectomy. Continue to monitor.   - Remove aguilar per primary team recs  - Routine post-op advances  - Continue PRN pain medications  - Encourage ambulation   - Encourage IS  - CBC stable  - UOP adequate   - Reminder of care per primary team    Will continue to follow along daily.    JOSE J Munoz MD  OBGYN PGY-4

## 2023-02-08 NOTE — PLAN OF CARE
Problem: Physical Therapy  Goal: Physical Therapy Goal  Description: Goals to be met by: 2023     Patient will increase functional independence with mobility by performin. Supine to sit with Set-up [place]  2. Sit to stand transfer with Supervision  3. Gait  x 500 feet with Supervision using LRAD.   4. Ascend/descend 3 stairs with left Handrails Supervision using No Assistive Device.     Outcome: Ongoing, Progressing

## 2023-02-08 NOTE — PLAN OF CARE
Problem: Adult Inpatient Plan of Care  Goal: Absence of Hospital-Acquired Illness or Injury  Outcome: Ongoing, Progressing  Goal: Optimal Comfort and Wellbeing  Outcome: Ongoing, Progressing  Goal: Readiness for Transition of Care  Outcome: Ongoing, Progressing     Problem: Bariatric Environmental Safety  Goal: Safety Maintained with Care  Outcome: Ongoing, Progressing     Problem: Infection  Goal: Absence of Infection Signs and Symptoms  Outcome: Ongoing, Progressing     Problem: Fall Injury Risk  Goal: Absence of Fall and Fall-Related Injury  Outcome: Ongoing, Progressing

## 2023-02-08 NOTE — PROGRESS NOTES
ANDRADE Sykes MD is  updated on pt's heart rate is getting higher. It is now in the 110's, she started with HR of 100's. Pt  is on IV fluids at 40cc/hr and received 0.6mg of dilaudid, 4mg Zofran and 5mg Compazine. Pt has  550ml  urine output from the aguilar catheter and it appears like a fruit punch color.  It is red but thin. MD stated that pt has great urine output and will send the in-house resident to check on patient and get the necessary labs/imaging.

## 2023-02-08 NOTE — PLAN OF CARE
[address] [facility]  Initial Discharge Assessment       Primary Care Provider: Kerry Tomas NP    Admission Diagnosis: Colostomy in place [Z93.3]    Admission Date: 2/7/2023  Expected Discharge Date:     Discharge Barriers Identified: None    Payor: MEDICAID / Plan: [place] [employer] / Product Type: Managed Medicaid /     Extended Emergency Contact Information  Primary Emergency Contact: Dejah Oconnor   [facility]  Home Phone: [phone]  Mobile Phone: [phone]  Relation: Mother   needed? No    Discharge Plan A: Home         [facility] - [place], [place] - [address]  [address] [facility] [address]  Phone: [phone] Fax: [phone]    [id number] - [place], [place] - [address]  [address] [facility] [address]  Phone: [phone] Fax: [phone]      Initial Assessment (most recent)       Adult Discharge Assessment - 02/08/23 1259          Discharge Assessment    Assessment Type Discharge Planning Brief Assessment     Confirmed/corrected address, phone number and insurance Yes     Confirmed Demographics Correct on Facesheet     Source of Information patient     When was your last doctors appointment? 02/02/23     Does patient/caregiver understand observation status Yes     Communicated IDALIA with patient/caregiver Yes     Reason For Admission Colostomy in place     People in Home significant other     Facility Arrived From: Home     Do you expect to return to your current living situation? Yes     Do you have help at home or someone to help you manage your care at home? Yes     Who are your caregiver(s) and their phone number(s)? [phone]     Prior to hospitilization cognitive status: Alert/Oriented     Current cognitive status: Alert/Oriented     Home Layout Able to live on 1st floor     Equipment Currently Used at Home wound care supplies     Readmission within 30 days? No     Patient currently being  followed by outpatient case management? No     Do you currently have service(s) that help you manage your care at home? No     Do you take prescription medications? Yes     Do you have prescription coverage? Yes     Coverage Medicaid     Do you have any problems affording any of your prescribed medications? No     Is the patient taking medications as prescribed? yes     Who is going to help you get home at discharge? [phone]     How do you get to doctors appointments? car, drives self     Are you on dialysis? No     Do you take coumadin? No     Discharge Plan A Home     DME Needed Upon Discharge  wound care supplies     Discharge Plan discussed with: Patient     Discharge Barriers Identified None                   Spoke to pt. Pt lives at home with [phone]. Post hospital  stay yoko will be pt support person and pt. has transportation at d/c with yoko. There have been no hospitalizations within the last 30 days per pt . Verified pt PCP and preferred pharmacy. Pt stated not on Coumadin and is not receiving dialysis. All questions answered regarding case management/ discharge planning , pt verbalized understanding. Discharge booklet with SW contact information given to pt. Pt stated would prefer outpatient wound care at [facility] than [facility].    Hawa Nettles LCSW  Case Management/[facility]  [phone]

## 2023-02-09 PROCEDURE — 99024 PR POST-OP FOLLOW-UP VISIT: ICD-10-PCS | Mod: ,,, | Performed by: OBSTETRICS & GYNECOLOGY

## 2023-02-09 PROCEDURE — 99024 POSTOP FOLLOW-UP VISIT: CPT | Mod: ,,, | Performed by: OBSTETRICS & GYNECOLOGY

## 2023-02-09 PROCEDURE — 99900035 HC TECH TIME PER 15 MIN (STAT)

## 2023-02-09 PROCEDURE — 97165 OT EVAL LOW COMPLEX 30 MIN: CPT

## 2023-02-09 PROCEDURE — 20600001 HC STEP DOWN PRIVATE ROOM

## 2023-02-09 PROCEDURE — 63600175 PHARM REV CODE 636 W HCPCS: Performed by: COLON & RECTAL SURGERY

## 2023-02-09 PROCEDURE — 25000003 PHARM REV CODE 250: Performed by: STUDENT IN AN ORGANIZED HEALTH CARE EDUCATION/TRAINING PROGRAM

## 2023-02-09 RX ORDER — OXYCODONE HYDROCHLORIDE 5 MG/1
5 TABLET ORAL EVERY 4 HOURS PRN
Status: DISCONTINUED | OUTPATIENT
Start: 2023-02-09 | End: 2023-02-09

## 2023-02-09 RX ORDER — OXYCODONE HYDROCHLORIDE 5 MG/1
5 TABLET ORAL EVERY 4 HOURS PRN
Status: DISCONTINUED | OUTPATIENT
Start: 2023-02-09 | End: 2023-02-11 | Stop reason: HOSPADM

## 2023-02-09 RX ORDER — METOPROLOL TARTRATE 50 MG/1
50 TABLET ORAL DAILY
Status: DISCONTINUED | OUTPATIENT
Start: 2023-02-10 | End: 2023-02-11 | Stop reason: HOSPADM

## 2023-02-09 RX ORDER — OXYCODONE HYDROCHLORIDE 10 MG/1
10 TABLET ORAL EVERY 4 HOURS PRN
Status: DISCONTINUED | OUTPATIENT
Start: 2023-02-09 | End: 2023-02-11 | Stop reason: HOSPADM

## 2023-02-09 RX ADMIN — GABAPENTIN 300 MG: 300 CAPSULE ORAL at 10:02

## 2023-02-09 RX ADMIN — IBUPROFEN 800 MG: 400 TABLET ORAL at 10:02

## 2023-02-09 RX ADMIN — ENOXAPARIN SODIUM 40 MG: 40 INJECTION SUBCUTANEOUS at 10:02

## 2023-02-09 RX ADMIN — ALVIMOPAN 12 MG: 12 CAPSULE ORAL at 08:02

## 2023-02-09 RX ADMIN — ESCITALOPRAM OXALATE 20 MG: 20 TABLET ORAL at 08:02

## 2023-02-09 RX ADMIN — ACETAMINOPHEN 1000 MG: 500 TABLET ORAL at 10:02

## 2023-02-09 RX ADMIN — METOPROLOL TARTRATE 50 MG: 50 TABLET, FILM COATED ORAL at 08:02

## 2023-02-09 RX ADMIN — ATORVASTATIN CALCIUM 80 MG: 40 TABLET, FILM COATED ORAL at 08:02

## 2023-02-09 RX ADMIN — ACETAMINOPHEN 1000 MG: 500 TABLET ORAL at 02:02

## 2023-02-09 RX ADMIN — GABAPENTIN 300 MG: 300 CAPSULE ORAL at 02:02

## 2023-02-09 RX ADMIN — CLONIDINE HYDROCHLORIDE 0.2 MG: 0.1 TABLET ORAL at 08:02

## 2023-02-09 RX ADMIN — AMLODIPINE BESYLATE 10 MG: 10 TABLET ORAL at 08:02

## 2023-02-09 RX ADMIN — ACETAMINOPHEN 1000 MG: 500 TABLET ORAL at 06:02

## 2023-02-09 RX ADMIN — ALVIMOPAN 12 MG: 12 CAPSULE ORAL at 10:02

## 2023-02-09 RX ADMIN — ENOXAPARIN SODIUM 40 MG: 40 INJECTION SUBCUTANEOUS at 08:02

## 2023-02-09 RX ADMIN — IBUPROFEN 800 MG: 400 TABLET ORAL at 06:02

## 2023-02-09 RX ADMIN — MUPIROCIN: 20 OINTMENT TOPICAL at 08:02

## 2023-02-09 RX ADMIN — IBUPROFEN 800 MG: 400 TABLET ORAL at 02:02

## 2023-02-09 RX ADMIN — GABAPENTIN 300 MG: 300 CAPSULE ORAL at 08:02

## 2023-02-09 NOTE — RESPIRATORY THERAPY
RAPID RESPONSE RESPIRATORY THERAPY ETCO2 CHECK         Time of visit: 912     Code Status: No Order   : 1980  Bed: 1055/1055 A:   MRN: [id number]  Time spent at the bedside: < 15 min    SITUATION    Evaluated patient for: ETCo2 compliance    BACKGROUND    Why is the patient in the hospital?: Colostomy in place    Patient has a past medical history of HLD (hyperlipidemia), HTN (hypertension), and Mental disorder.    24 Hours Vitals Range:  Temp:  [94.6 °F (34.8 °C)-99.5 °F (37.5 °C)]   Pulse:  []   Resp:  [14-20]   BP: (104-143)/(55-89)   SpO2:  [92 %-96 %]     Labs:    Recent Labs     23  21023  0236 23  0748    135*  --    K 3.7 3.5  --     108  --    CO2 17* 16*  --    CREATININE 0.9 0.6  --    * 128*  --    PHOS 3.2 1.9*  --    MG 1.7 7.7* 2.0        No results for input(s): PH, PCO2, PO2, HCO3, POCSATURATED, BE in the last 72 hours.    ASSESSMENT/INTERVENTIONS      Last VS   Temp: 97.6 °F (36.4 °C) (839)  Pulse: 84 (839)  Resp: 16 (839)  BP: 104/55 (839)  SpO2: 92 % (839)    Level of Consciousness: Level of Consciousness (AVPU): alert  Respiratory Effort:   Expansion/Accessory Muscle Usage:    All Lung Field Breath Sounds:    Is the ETCO2 monitor on? Yes  Is the patient wearing a cannula? Yes  Are ETCO2 orders placed? Yes  Is the patient on a PCA pump? Yes  ETCO2 monitored: ETCO2 (mmHg): 45 mmHg  Ambu at bedside:      Active Orders   Respiratory Care    END TIDAL CO2 MONITOR Q12H     Frequency: Q12H     Number of Occurrences: Until Specified    Incentive spirometry     Frequency: Q4H     Number of Occurrences: Until Specified     Order Comments: Q4 while awake until discharge.  Educate patient in use; Keep incentive spirometer within reach; and Document incentive spirometer volume every 4 hours while awake.    ((10 sets per hour (3-5 inspirations per set)) on original order)      Oxygen Continuous     Frequency: Continuous      Number of Occurrences: Until Specified     Order Questions:      Device type: Low flow      Device: Nasal Cannula (1- 5 Liters)      LPM: 1-5      Titrate O2 per Oxygen Titration Protocol: Yes      To maintain SpO2 goal of: >= 90%      Notify MD of: Inability to achieve desired SpO2; Sudden change in patient status and requires 20% increase in FiO2; Patient requires >60% FiO2    Pulse Oximetry Q Shift     Frequency: Q Shift     Number of Occurrences: Until Specified       RECOMMENDATIONS    We recommend: RRT Recs: Continue POC per primary team.      FOLLOW-UP    Please call back the Rapid Response RT, Griselda Ha RRT at x [phone] for any questions or concerns.

## 2023-02-09 NOTE — PT/OT/SLP EVAL
Occupational Therapy   Evaluation and Discharge Note    Name: Lizbeth Leonard  MRN: [id number]  Admitting Diagnosis: Colostomy in place  Recent Surgery: Procedure(s) (LRB):  CLOSURE, COLOSTOMY (N/A)  HYSTERECTOMY, TOTAL, ABDOMINAL (N/A)  CATHETERIZATION, URETER (Bilateral)  SALPINGECTOMY (Bilateral)  OOPHORECTOMY (Right)  SIGMOIDOSCOPY, FLEXIBLE (N/A)  MOBILIZATION, SPLENIC FLEXURE  COLECTOMY, SIGMOID 2 Days Post-Op    Recommendations:     Discharge Recommendations: home  Discharge Equipment Recommendations: none  Barriers to discharge:  None    Assessment:     Lizbeth Leonard is a 42 y.o. female with a medical diagnosis of Colostomy closure and hysterectomy. At this time, patient is functioning at their prior level of function and does not require further acute OT services.     Plan:     During this hospitalization, patient does not require further acute OT services.  Please re-consult if situation changes.    Plan of Care Reviewed with: patient    Subjective     Occupational Profile:  Living Environment: Pt live with lolita and son in [facility] with 3 MAURICIO with a L HR.  Prior to admission, patients level of function was independent.    Equipment used at home: bedside commode, rollator, shower chair.   Upon discharge, patient will have assistance from [facility].    Pain/Comfort:  Pain Rating 1: 0/10    Patients cultural, spiritual, Anabaptist conflicts given the current situation:      Objective:     Communicated with: rn prior to session.  Patient found supine with wound vac, peripheral IV upon OT entry to room.    General Precautions: Standard,    Orthopedic Precautions:    Braces:    Respiratory Status: Room air     Occupational Performance:    Bed Mobility:    Independent    Functional Mobility/Transfers:  Independent    Activities of Daily Living:  Independent    Cognitive/Visual Perceptual:  Cognitive/Psychosocial Skills:     -       Oriented to: Person, Place, Time, and Situation   -       Safety awareness/insight to  disability: intact     Physical Exam:  BUE AROM/MMT: WNL    AMPAC 6 Click ADL:  AMPAC Total Score: 24    Treatment & Education:  UE ROM/MMT  Bed mobility training / assessment  Functional mobility assessment  Sit/standing balance assessment  Educated on importance of sitting OOB in bedside chair to promote increased strength, endurance & breathing.  Discussed D/C of OT    Patient left ambulatory in room/gomez with all lines intact and call button in reach    GOALS:   Multidisciplinary Problems       Occupational Therapy Goals       Not on file                    History:     Past Medical History:   Diagnosis Date    HLD (hyperlipidemia)     HTN (hypertension)     Mental disorder          Past Surgical History:   Procedure Laterality Date    CATHETERIZATION OF URETER Bilateral 2/7/2023    Procedure: CATHETERIZATION, URETER;  Surgeon: Brianna Bonilla MD;  [facility];  Service: Urology;  Laterality: Bilateral;    CLOSURE, COLOSTOMY N/A 2/7/2023    Procedure: CLOSURE, COLOSTOMY;  Surgeon: Eros Friedman MD;  [facility];  Service: Colon and Rectal;  Laterality: N/A;    COLECTOMY, SIGMOID  2/7/2023    Procedure: COLECTOMY, SIGMOID;  Surgeon: Eros Friedman MD;  Location: [facility] OR 2ND FLR;  Service: Colon and Rectal;;    COLONOSCOPY N/A 2/6/2023    Procedure: COLONOSCOPY;  Surgeon: Eros Friedman MD;  Location: [facility] (4TH FLR);  Service: Endoscopy;  Laterality: N/A;  Patient needs to be scheduled for a colonoscopy on 2/6 with Dr Friedman in preparation for surgery. Patient's previous colonoscopy was cancelled due to illness, please let me know if new order is needed, thanks!   sutab, prep instr portal -ml  BMI 48.3  1/12 pt reschedule    FLEXIBLE SIGMOIDOSCOPY N/A 2/7/2023    Procedure: SIGMOIDOSCOPY, FLEXIBLE;  Surgeon: Eros Friedman MD;  Location: [facility] OR 2ND FLR;  Service: Colon and Rectal;  Laterality: N/A;    MOBILIZATION OF SPLENIC FLEXURE  2/7/2023    Procedure:  MOBILIZATION, SPLENIC FLEXURE;  Surgeon: Eros Friedman MD;  Location: [facility] OR [address] [place] [address];  Service: Colon and Rectal;;    OOPHORECTOMY Right 2/7/2023    Procedure: OOPHORECTOMY;  Surgeon: Dm Harris MD;  Location: [facility] OR [address] [place] [address];  Service: Oncology;  Laterality: Right;    Robotic Sigmoid colectomy  01/23/2022    ... Creaction of end colostomy, drainage of intra abdominal abcess with washout, extensive lysis of dense adhesions    SALPINGECTOMY Bilateral 2/7/2023    Procedure: SALPINGECTOMY;  Surgeon: Dm Harris MD;  Location: [facility] OR [address] [place] [address];  Service: Oncology;  Laterality: Bilateral;    TOTAL ABDOMINAL HYSTERECTOMY N/A 2/7/2023    Procedure: HYSTERECTOMY, TOTAL, ABDOMINAL;  Surgeon: Dm Harris MD;  Location: [facility] OR [address] [place] [address];  Service: Oncology;  Laterality: N/A;       Time Tracking:     OT Date of Treatment: 02/09/23  OT Start Time: 1108  OT Stop Time: 1122  OT Total Time (min): 14 min    Billable Minutes:Evaluation 14    2/9/2023

## 2023-02-09 NOTE — PLAN OF CARE
02/09/23 1508   Post-Acute Status   Post-Acute Authorization Home Health   Home Health Status Referrals Sent   Coverage [facility] manually faxed HH service referral. No PT/OT recommendations.  Will need nursing services.  Will cont to monitor d/c needs.    4:05 PM    Your fax has been successfully sent to [phone] at [phone].      Susie Christie RN  Case Management  [facility]  [phone]

## 2023-02-09 NOTE — PROGRESS NOTES
Progress Note  Gyn Onc    Admit Date: 2023  LOS: 2    Reason for Admission:  Colostomy in place    SUBJECTIVE:     Lizbeth Leonard is a 42 y.o.  who is 2 Days Post-Op  s/p FAVIAN/BS/RO and reanastamosis of colostomy.  Pt doing well this morning. Pain is now well controlled. She is tolerating clears without n/v. +ambulation. Voiding freely. Not passing flatus. She is not yet having bowel movements.    OBJECTIVE:     Vital Signs   Temp:  [94.6 °F (34.8 °C)-99.5 °F (37.5 °C)] 94.6 °F (34.8 °C)  Pulse:  [] 91  Resp:  [14-20] 20  SpO2:  [94 %-96 %] 94 %  BP: (110-186)/() 110/70      Intake/Output Summary (Last 24 hours) at 2023 0622  Last data filed at 2023 1747  Gross per 24 hour   Intake 504 ml   Output 1200 ml   Net -696 ml       Physical Exam:  Gen: A&Ox3, NAD  CV: Regular rate  Pulm: Normal respiratory effort  Abd: soft, non-distended, appropriately tender to palpation without rebound or guarding  Inc: clean, dry, intact   Ext: No peripheral edema, TEDs/SCDs in place   : deferred    Laboratory:  N/a    ASSESSMENT/PLAN:     Active Hospital Problems    Diagnosis  POA    *Colostomy in place [Z93.3]  Not Applicable    Obesity [E66.9]  Yes    HTN (hypertension) [I10]  Yes      Resolved Hospital Problems   No resolved problems to display.       Assessment: 42 y.o.  post op day 2, meeting most post op milestones, apart from bowel function    Plan:   1. Post-op care per primary team  - Blood tinged urine improving  - Routine post-op advances  - Continue PRN pain medications  - Encourage ambulation   - Encourage IS  - Reminder of care per primary team    Will continue to follow along daily.    JOSE J Munoz MD  OBGYN PGY-4

## 2023-02-09 NOTE — PLAN OF CARE
Surgery   [facility]    HOME HEALTH ORDERS  FACE TO FACE ENCOUNTER    Patient Name: Lizbeth Leonard  YOB: 1980    PCP: Kerry Tomas NP   PCP Address: [address] / [place] [address]  PCP Phone Number: [phone]  PCP Fax: [phone]    Encounter Date: 02/09/2023    Admit to Home Health    Diagnoses:  Active Hospital Problems    Diagnosis  POA    *Colostomy in place [Z93.3]  Not Applicable    Obesity [E66.9]  Yes    HTN (hypertension) [I10]  Yes      Resolved Hospital Problems   No resolved problems to display.       Future Appointments   Date Time Provider Department Center   3/7/2023 10:45 AM Dm Harris MD [facility] GYN ONC [facility]   3/7/2023  1:00 PM Selena Obrien NP [facility] COLON [facility]           I have seen and examined this patient face to face today. My clinical findings that support the need for the home health skilled services and home bound status are the following:  Medical restrictions requiring assistance of another human to leave home due to Wound care needs, Post surgery monitoring, and Morbid Obesity.    Allergies:  Review of patient's allergies indicates:   Allergen Reactions    Sulfa (sulfonamide antibiotics)        Diet: regular diet    Activities: no driving while on analgesics and no heavy lifting over 10 lbs. for 6 weeks    Nursing:   SN to complete comprehensive assessment including routine vital signs. Instruct on disease process and s/s of complications to report to MD. Review/verify medication list sent home with the patient at time of discharge  and instruct patient/caregiver as needed. Frequency may be adjusted depending on start of care date. If patient has enteral feeding tube (NG, PEG, J-tube, G-tube), flush tube before and after feeding and/or medication administration with 20-30 mL of water.    Notify MD if SBP > 160 or < 90; DBP > 90 or < 50; HR > 120 or < 50; Temp > 101; Other:          CONSULTS:    Physical Therapy to  evaluate and treat. Evaluate for home safety and equipment needs; Establish/upgrade home exercise program. Perform / instruct on therapeutic exercises, gait training, transfer training, and Range of Motion.    MISCELLANEOUS CARE:  N/A    WOUND CARE ORDERS  Change packing in left upper quadrant wound daily; monitor for drainage   : Wound care to abdominal wound:  wound care provided orders for her discharge tomorrow  Cleanse wound with sterile normal saline   Pat dry.   Apply saline damp gauze to wound bed   Cover with dry sterile dressing.    Medications: Review discharge medications with patient and family and provide education.      Current Discharge Medication List        CONTINUE these medications which have NOT CHANGED    Details   ALPRAZolam (XANAX) 0.5 MG tablet Take 1 tablet (0.5 mg total) by mouth 2 (two) times daily as needed for Anxiety.  Qty: 30 tablet, Refills: 1    Associated Diagnoses: Anxiety      !! amLODIPine (NORVASC) 10 MG tablet Take 1 tablet by mouth once daily  Qty: 90 tablet, Refills: 0    Associated Diagnoses: Hypertension, unspecified type      benzonatate (TESSALON) 100 MG capsule Take 100 mg by mouth 3 (three) times daily as needed for Cough.      EScitalopram oxalate (LEXAPRO) 20 MG tablet Take 1 tablet (20 mg total) by mouth once daily.  Qty: 90 tablet, Refills: 1    Associated Diagnoses: Anxiety      !! metoprolol tartrate (LOPRESSOR) 50 MG tablet Take 50 mg by mouth once daily.      !! metoprolol tartrate (LOPRESSOR) 50 MG tablet Take 1 tablet (50 mg total) by mouth 2 (two) times daily.  Qty: 180 tablet, Refills: 0    Comments: .      potassium chloride SA (K-DUR,KLOR-CON) 20 MEQ tablet Daily      rosuvastatin (CRESTOR) 40 MG Tab Take 1 tablet (40 mg total) by mouth every evening.  Qty: 90 tablet, Refills: 1    Associated Diagnoses: Mixed hyperlipidemia      sod sulf-pot chloride-mag sulf (SUTAB) 1.479-0.188- 0.225 gram tablet Take 12 tablets by mouth once daily. Take as directed from  provider. BIN: [id number], PCN: VIVI, GROUP: [id number], MEMBER ID: [id number]  Qty: 24 tablet, Refills: 0    Comments: Please see coupon code. BIN: [id number], PCN: VIVI, GROUP: [id number], MEMBER ID: [id number]  Associated Diagnoses: Special screening for malignant neoplasms, colon      !! amLODIPine (NORVASC) 10 MG tablet Take 1 tablet (10 mg total) by mouth once daily.  Qty: 90 tablet, Refills: 1    Comments: .  Associated Diagnoses: Hypertension, unspecified type      cloNIDine (CATAPRES) 0.2 MG tablet Take 1 tablet (0.2 mg total) by mouth 3 (three) times daily.  Qty: 90 tablet, Refills: 3    Associated Diagnoses: Essential hypertension       !! - Potential duplicate medications found. Please discuss with provider.          I certify that this patient is confined to her home and needs intermittent skilled nursing care and physical therapy.

## 2023-02-09 NOTE — PROGRESS NOTES
Patient Name: Lizbeth Leonard  Date: 2023  Service: Colon and Rectal Surgery    Subjective:  Tachycardia resolved, pain well controlled. Tolerated small volume diet without nausea/vomiting. Has not yet ambulated; using IS. [place] light red. Denies fevers/chills/sweats, lightheadedness/dizziness, chest pain/shortness of breath, upper or lower extremity edema/pain.     Medications:    Current Facility-Administered Medications:     [COMPLETED] acetaminophen 1,000 mg/100 mL (10 mg/mL) injection 1,000 mg, 1,000 mg, Intravenous, Q8H, Stopped at 23 **FOLLOWED BY** acetaminophen tablet 1,000 mg, 1,000 mg, Oral, Q8H, Alla Sykes MD, 1,000 mg at 23    ALPRAZolam tablet 0.5 mg, 0.5 mg, Oral, BID PRN, Alla Sykes MD, 0.5 mg at 23    alvimopan capsule 12 mg, 12 mg, Oral, BID, Alla Sykes MD, 12 mg at 23 0852    amLODIPine tablet 10 mg, 10 mg, Oral, Daily, Alla Sykes MD, 10 mg at 23 0858    atorvastatin tablet 80 mg, 80 mg, Oral, Daily, Alla Sykes MD, 80 mg at 23 0853    cloNIDine tablet 0.2 mg, 0.2 mg, Oral, TID, Alla Sykes MD, 0.2 mg at 23 08    enoxaparin injection 40 mg, 40 mg, Subcutaneous, Q12H, Eros Friedman MD, 40 mg at 23 0852    EScitalopram oxalate tablet 20 mg, 20 mg, Oral, Daily, Alla Sykes MD, 20 mg at 23 08    gabapentin capsule 300 mg, 300 mg, Oral, TID, Alla Sykes MD, 300 mg at 23 08    HYDROmorphone injection 0.2 mg, 0.2 mg, Intravenous, Q5 Min PRN, Felton Long MD, 0.2 mg at 23    HYDROmorphone PCA syringe 6 mg/30 mL (0.2 mg/mL) NS, , Intravenous, Continuous, Alla Sykes MD, New Syringe/Bag at 23 1044    [] ibuprofen 800 mg in dextrose 5 % 250 mL (Vial-Mate), 800 mg, Intravenous, Q8H, 800 mg at 23 1449 **FOLLOWED BY** ibuprofen tablet 800 mg, 800 mg, Oral, Q8H, Alla Sykes MD, 800 mg at 23 0608    metoprolol tartrate (LOPRESSOR)  tablet 50 mg, 50 mg, Oral, BID, Alla Sykes MD, 50 mg at 02/09/23 0853    mupirocin 2 % ointment, , Nasal, BID, Alla Sykes MD, Given at 02/09/23 0858    naloxone 0.4 mg/mL injection 0.02 mg, 0.02 mg, Intravenous, PRN, Alla Sykes MD    ondansetron injection 4 mg, 4 mg, Intravenous, Q12H PRN, Alla Syeks MD, 4 mg at 02/07/23 1934    sodium chloride 0.9% flush 10 mL, 10 mL, Intravenous, PRN, Felton Long MD    sodium chloride 0.9% flush 10 mL, 10 mL, Intra-Catheter, PRN, Alla Sykes MD    traMADoL tablet 50 mg, 50 mg, Oral, Q6H PRN, Alla Sykes MD, 50 mg at 02/08/23 0824    Vital Signs:  Vitals:    02/09/23 0839   BP: (!) 104/55   Pulse: 84   Resp: 16   Temp: 97.6 °F (36.4 °C)           Physical Exam:  General: Alert, oriented, in no apparent distress  HEENT: Sclera anicteric, trachea midline  Lungs: Normal respiratory rate and effort on room air  Abdomen: Soft, nontender, nondistended. Prevena clean/dry/intact without erythema or drainage.  Extremities: Warm, well perfused, no edema, SCDs in place  Neuro: Grossly intact, moves all extremities  Psych: Appropriate affect    Laboratory Studies:    Complete Blood Count:  Lab Results   Component Value Date/Time    WBC 14.19 (H) 02/08/2023 02:36 AM    WBC 7.39 01/06/2023 08:07 AM    HGB 13.2 02/08/2023 02:36 AM    HCT 42.4 02/08/2023 02:36 AM    RBC 5.14 02/08/2023 02:36 AM     (H) 02/08/2023 02:36 AM       Basic Chemistry Panel:  Lab Results   Component Value Date/Time     (L) 02/08/2023 02:36 AM     01/06/2023 08:07 AM    K 3.5 02/08/2023 02:36 AM    K 4.7 01/06/2023 08:07 AM     02/08/2023 02:36 AM     01/06/2023 08:07 AM    CO2 16 (L) 02/08/2023 02:36 AM    CO2 28 01/06/2023 08:07 AM    BUN 6 02/08/2023 02:36 AM    BUN 7.5 01/06/2023 08:07 AM    CREATININE 0.6 02/08/2023 02:36 AM    CREATININE 0.68 01/06/2023 08:07 AM    CALCIUM 7.5 (L) 02/08/2023 02:36 AM    CALCIUM 9.3 01/06/2023 08:07 AM            Imaging Studies:  none    Assessment:  Lizbeth Leonard is a 42 y.o. year old female S/p open colostomy reversal, hysterectomy, doing well     Continue multimodality pain control - change to oral narcotics  Advance to a regular diet  Stop IV fluids  Continue DVT ppx    Plan:    Patient discussed with attending, Dr. Friedman.    Alla Sykes MD  Colon and Rectal Surgery Fellow

## 2023-02-10 LAB — CRP SERPL-MCNC: 243.8 MG/L (ref 0–8.2)

## 2023-02-10 PROCEDURE — 25000003 PHARM REV CODE 250: Performed by: STUDENT IN AN ORGANIZED HEALTH CARE EDUCATION/TRAINING PROGRAM

## 2023-02-10 PROCEDURE — 36415 COLL VENOUS BLD VENIPUNCTURE: CPT | Performed by: STUDENT IN AN ORGANIZED HEALTH CARE EDUCATION/TRAINING PROGRAM

## 2023-02-10 PROCEDURE — 20600001 HC STEP DOWN PRIVATE ROOM

## 2023-02-10 PROCEDURE — 63600175 PHARM REV CODE 636 W HCPCS: Performed by: COLON & RECTAL SURGERY

## 2023-02-10 PROCEDURE — 99024 POSTOP FOLLOW-UP VISIT: CPT | Mod: ,,, | Performed by: OBSTETRICS & GYNECOLOGY

## 2023-02-10 PROCEDURE — 99024 PR POST-OP FOLLOW-UP VISIT: ICD-10-PCS | Mod: ,,, | Performed by: OBSTETRICS & GYNECOLOGY

## 2023-02-10 PROCEDURE — 97116 GAIT TRAINING THERAPY: CPT

## 2023-02-10 PROCEDURE — 86140 C-REACTIVE PROTEIN: CPT | Performed by: STUDENT IN AN ORGANIZED HEALTH CARE EDUCATION/TRAINING PROGRAM

## 2023-02-10 RX ORDER — CLONIDINE HYDROCHLORIDE 0.1 MG/1
0.2 TABLET ORAL 2 TIMES DAILY
Status: DISCONTINUED | OUTPATIENT
Start: 2023-02-10 | End: 2023-02-11 | Stop reason: HOSPADM

## 2023-02-10 RX ADMIN — ENOXAPARIN SODIUM 40 MG: 40 INJECTION SUBCUTANEOUS at 09:02

## 2023-02-10 RX ADMIN — MUPIROCIN: 20 OINTMENT TOPICAL at 09:02

## 2023-02-10 RX ADMIN — ALVIMOPAN 12 MG: 12 CAPSULE ORAL at 09:02

## 2023-02-10 RX ADMIN — ATORVASTATIN CALCIUM 80 MG: 40 TABLET, FILM COATED ORAL at 09:02

## 2023-02-10 RX ADMIN — ALPRAZOLAM 0.5 MG: 0.5 TABLET ORAL at 03:02

## 2023-02-10 RX ADMIN — IBUPROFEN 800 MG: 400 TABLET ORAL at 09:02

## 2023-02-10 RX ADMIN — ESCITALOPRAM OXALATE 20 MG: 20 TABLET ORAL at 09:02

## 2023-02-10 RX ADMIN — GABAPENTIN 300 MG: 300 CAPSULE ORAL at 03:02

## 2023-02-10 RX ADMIN — ACETAMINOPHEN 1000 MG: 500 TABLET ORAL at 01:02

## 2023-02-10 RX ADMIN — ACETAMINOPHEN 1000 MG: 500 TABLET ORAL at 09:02

## 2023-02-10 RX ADMIN — IBUPROFEN 800 MG: 400 TABLET ORAL at 02:02

## 2023-02-10 RX ADMIN — CLONIDINE HYDROCHLORIDE 0.2 MG: 0.1 TABLET ORAL at 09:02

## 2023-02-10 RX ADMIN — TRAMADOL HYDROCHLORIDE 50 MG: 50 TABLET, COATED ORAL at 09:02

## 2023-02-10 RX ADMIN — AMLODIPINE BESYLATE 10 MG: 10 TABLET ORAL at 09:02

## 2023-02-10 NOTE — NURSING
Spoke with resident, Dr. Saleh, on call for Dr. Friedman. Informed that former colonoscopy area was draining sanguinous, & RN is changing dressing for fourth time today. Packed with gauze & secured with Tegaderm pulled taunt, & replaced ABD binder.  Dr. Saleh stated he would inform fellow with team

## 2023-02-10 NOTE — PLAN OF CARE
"          1:56 PM  Spoke to Shahla at [facility] and will fax wound care orders      2:08 PM    Spoke to Selena in wound care and will come to evaluate for simple wound care orders needed for home health.  Wound care consult entered.  Patient  made aware.      2:14 PM  Spoke to Honey WOOD Wound care specialist and will speak with team and put in recommended orders.     3:43 PM  Received wound care orders from wound care nurse, Honey WOOD  Orders faxed to [facility] and  Shahla @ [facility] notified of fax.    3:45 PM    2/10/2023 3:38:16 PM Transmission Record   Sent to [phone] with remote ID "[id number]       "   Result: (0/339;0/0) Success   Page record: 1 - 3   Elapsed time: 01:12 on channel 15    4:02 PM  Wound care orders modified to  orders and HH orders faxed to [facility].     4:15 PM    2/10/2023 4:09:39 PM Transmission Record   Sent to [phone] with remote ID "[id number]      "   Result: (0/339;0/0) Success   Page record: 1 - 6   Elapsed time: 01:57 on channel 58      Susie Christie RN  Case Management  [facility]  [phone]    "

## 2023-02-10 NOTE — PROGRESS NOTES
Patient Name: Lizbeth Leonard  Date: 2/10/2023  Service: Colon and Rectal Surgery    Subjective:  Tachycardia resolved, pain well controlled. Tolerated diet without nausea/vomiting. Has not yet ambulated; using IS. Denies fevers/chills/sweats, lightheadedness/dizziness, chest pain/shortness of breath, upper or lower extremity edema/pain. Reports flatus and bowel movement    Medications:    Current Facility-Administered Medications:     [COMPLETED] acetaminophen 1,000 mg/100 mL (10 mg/mL) injection 1,000 mg, 1,000 mg, Intravenous, Q8H, Stopped at 23 **FOLLOWED BY** acetaminophen tablet 1,000 mg, 1,000 mg, Oral, Q8H, Alla Sykes MD, 1,000 mg at 23    ALPRAZolam tablet 0.5 mg, 0.5 mg, Oral, BID PRN, Alla Sykes MD, 0.5 mg at 23    alvimopan capsule 12 mg, 12 mg, Oral, BID, Alla Sykes MD, 12 mg at 23 2218    amLODIPine tablet 10 mg, 10 mg, Oral, Daily, Alla Sykse MD, 10 mg at 23 0858    atorvastatin tablet 80 mg, 80 mg, Oral, Daily, Alla Sykes MD, 80 mg at 23 0853    cloNIDine tablet 0.2 mg, 0.2 mg, Oral, TID, Alla Sykes MD, 0.2 mg at 23 0853    enoxaparin injection 40 mg, 40 mg, Subcutaneous, Q12H, Eros Friedman MD, 40 mg at 23 2224    EScitalopram oxalate tablet 20 mg, 20 mg, Oral, Daily, Alla Sykes MD, 20 mg at 23 0852    gabapentin capsule 300 mg, 300 mg, Oral, TID, Alla Sykes MD, 300 mg at 23 2218    HYDROmorphone injection 0.2 mg, 0.2 mg, Intravenous, Q5 Min PRN, Felton Long MD, 0.2 mg at 23    [] ibuprofen 800 mg in dextrose 5 % 250 mL (Vial-Mate), 800 mg, Intravenous, Q8H, 800 mg at 23 1449 **FOLLOWED BY** ibuprofen tablet 800 mg, 800 mg, Oral, Q8H, Alla Sykes MD, 800 mg at 23 2225    metoprolol tartrate (LOPRESSOR) tablet 50 mg, 50 mg, Oral, Daily, Alla Sykes MD    mupirocin 2 % ointment, , Nasal, BID, Alla Sykes MD, Given at 23  0858    ondansetron injection 4 mg, 4 mg, Intravenous, Q12H PRN, Alla Sykes MD, 4 mg at 02/07/23 1934    oxyCODONE immediate release tablet 5 mg, 5 mg, Oral, Q4H PRN, Alla Sykes MD    oxyCODONE immediate release tablet Tab 10 mg, 10 mg, Oral, Q4H PRN, Alla Sykes MD    sodium chloride 0.9% flush 10 mL, 10 mL, Intravenous, PRN, Felton Long MD    sodium chloride 0.9% flush 10 mL, 10 mL, Intra-Catheter, PRN, Alla Sykes MD    traMADoL tablet 50 mg, 50 mg, Oral, Q6H PRN, Alla Sykes MD, 50 mg at 02/08/23 0824    Vital Signs:  Vitals:    02/10/23 0714   BP: (!) 116/56   Pulse: 77   Resp: 18   Temp: 97.8 °F (36.6 °C)           Physical Exam:  General: Alert, oriented, in no apparent distress  HEENT: Sclera anicteric, trachea midline  Lungs: Normal respiratory rate and effort on room air  Abdomen: Soft, nontender, nondistended. Prevena clean/dry/intact without erythema or drainage.  Extremities: Warm, well perfused, no edema, SCDs in place  Neuro: Grossly intact, moves all extremities  Psych: Appropriate affect    Laboratory Studies:    Complete Blood Count:  Lab Results   Component Value Date/Time    WBC 14.19 (H) 02/08/2023 02:36 AM    WBC 7.39 01/06/2023 08:07 AM    HGB 13.2 02/08/2023 02:36 AM    HCT 42.4 02/08/2023 02:36 AM    RBC 5.14 02/08/2023 02:36 AM     (H) 02/08/2023 02:36 AM       Basic Chemistry Panel:  Lab Results   Component Value Date/Time     (L) 02/08/2023 02:36 AM     01/06/2023 08:07 AM    K 3.5 02/08/2023 02:36 AM    K 4.7 01/06/2023 08:07 AM     02/08/2023 02:36 AM     01/06/2023 08:07 AM    CO2 16 (L) 02/08/2023 02:36 AM    CO2 28 01/06/2023 08:07 AM    BUN 6 02/08/2023 02:36 AM    BUN 7.5 01/06/2023 08:07 AM    CREATININE 0.6 02/08/2023 02:36 AM    CREATININE 0.68 01/06/2023 08:07 AM    CALCIUM 7.5 (L) 02/08/2023 02:36 AM    CALCIUM 9.3 01/06/2023 08:07 AM           Imaging Studies:  none    Assessment:  Lizbeth Leonard is a 42 y.o. year  old female S/p open colostomy reversal, hysterectomy, doing well     Continue multimodality pain control - change to oral narcotics  Continue regular diet  Continue DVT ppx  Wound care    Plan:    Patient discussed with attending, Dr. Friedman.    Alla Sykes MD  Colon and Rectal Surgery Fellow

## 2023-02-10 NOTE — PT/OT/SLP PROGRESS
Physical Therapy Treatment    Patient Name:  Lizbeth Leonard   MRN:  [id number]    Recommendations:     Discharge Recommendations: home  Discharge Equipment Recommendations: none  Barriers to discharge: None    Assessment:     Lizbeth Leonard is a 42 y.o. female admitted with a medical diagnosis of Colostomy in place.  She presents with the following impairments/functional limitations: impaired endurance, impaired functional mobility. Pt was receptive and tolerated physical therapy treatment well. Pt shows progression with decrease in assistance needed for amb with RW, pt able to amb 450 feet this session. Pt would continue to benefit from acute skilled physical therapy services for safe return home. Plan to incorporate stair trials on next visit.    Rehab Prognosis: Good; patient would benefit from acute skilled PT services to address these deficits and reach maximum level of function.    Recent Surgery: Procedure(s) (LRB):  CLOSURE, COLOSTOMY (N/A)  HYSTERECTOMY, TOTAL, ABDOMINAL (N/A)  CATHETERIZATION, URETER (Bilateral)  SALPINGECTOMY (Bilateral)  OOPHORECTOMY (Right)  SIGMOIDOSCOPY, FLEXIBLE (N/A)  MOBILIZATION, SPLENIC FLEXURE  COLECTOMY, SIGMOID 3 Days Post-Op    Plan:     During this hospitalization, patient to be seen 3 x/week to address the identified rehab impairments via gait training, therapeutic activities, therapeutic exercises and progress toward the following goals:    Plan of Care Expires:  03/10/23    Subjective     Chief Complaint: mild abdominal soreness  Patient/Family Comments/goals: been able to walk to the bathroom by myself  Pain/Comfort:  Pain Rating 1: other (see comments) (pt did not rate)  Location - Orientation 1: generalized  Location 1: abdomen  Pain Addressed 1: Reposition, Distraction  Pain Rating Post-Intervention 1: 0/10      Objective:     Communicated with nurse prior to session.  Patient found sitting edge of bed with wound vac upon PT entry to room.     General Precautions:  Standard, fall  Orthopedic Precautions: N/A  Braces: N/A  Respiratory Status: Room air     Functional Mobility:  Transfers:     Sit to Stand:  stand by assistance with no AD  Toilet transfer: independence with RW using stand pivot  Gait: 450' with RW and supervision. Pt amb with fair kirstin and no LOB.  Balance: Static sitting balance: good, pt able to sit EOB with supervision  Static/dynamic standing balance: good, pt able to stand with supervision, still uses RW for amb      AM-PAC 6 CLICK MOBILITY  Turning over in bed (including adjusting bedclothes, sheets and blankets)?: 4  Sitting down on and standing up from a chair with arms (e.g., wheelchair, bedside commode, etc.): 4  Moving from lying on back to sitting on the side of the bed?: 4  Moving to and from a bed to a chair (including a wheelchair)?: 4  Need to walk in hospital room?: 3  Climbing 3-5 steps with a railing?: 3  Basic Mobility Total Score: 22       Treatment & Education:  Discussed continued POC with pt who verbalized understanding. Educated pt on safety with mobility. Educated pt on importance of continued mobility during hospital stay.    Patient left up in chair with all lines intact and call button in reach..    GOALS:   Multidisciplinary Problems       Physical Therapy Goals          Problem: Physical Therapy    Goal Priority Disciplines Outcome Goal Variances Interventions   Physical Therapy Goal     PT, PT/OT Ongoing, Progressing     Description: Goals to be met by: 2023     Patient will increase functional independence with mobility by performin. Supine to sit with Set-up [place]  2. Sit to stand transfer with Supervision  3. Gait  x 500 feet with Supervision using LRAD.   4. Ascend/descend 3 stairs with left Handrails Supervision using No Assistive Device.                          Time Tracking:     PT Received On: 02/10/23  PT Start Time: 1054     PT Stop Time: 1110  PT Total Time (min): 16 min     Billable Minutes: Gait  Training 16    Treatment Type: Treatment  PT/PTA: PT           02/10/2023

## 2023-02-10 NOTE — PROGRESS NOTES
Progress Note  Gyn Onc    Admit Date: 2023  LOS: 3    Reason for Admission:  Colostomy in place    SUBJECTIVE:     Lizbeth Leonadr is a 42 y.o.  who is 3 Days Post-Op  s/p FAVIAN/BS/RO and reanastamosis of colostomy.  Patient is doing well this morning. She reports her pain is well controlled on her current regimen. She is tolerating a her current diet without nausea or vomiting. She is ambulating and voiding spontaneously. She has passed flatus and had a bowel movement.     OBJECTIVE:     Vital Signs   Temp:  [97.5 °F (36.4 °C)-97.9 °F (36.6 °C)] 97.8 °F (36.6 °C)  Pulse:  [72-84] 77  Resp:  [16-18] 18  SpO2:  [92 %-97 %] 97 %  BP: (100-116)/(51-64) 116/56      Intake/Output Summary (Last 24 hours) at 2/10/2023 0718  Last data filed at 2023  Gross per 24 hour   Intake 350 ml   Output 5 ml   Net 345 ml         Physical Exam:  Gen: alert and oriented, in no acute distress  CV: Regular rate  Pulm: Normal respiratory effort  Abd: soft, non-distended, appropriately tender to palpation without rebound or guarding  Inc: midline incision covered with wound vac, no surrounding erythema  Ext: No peripheral edema, TEDs/SCDs in place   : deferred    Laboratory:  N/a    ASSESSMENT/PLAN:     Active Hospital Problems    Diagnosis  POA    *Colostomy in place [Z93.3]  Not Applicable    Obesity [E66.9]  Yes    HTN (hypertension) [I10]  Yes      Resolved Hospital Problems   No resolved problems to display.       Assessment: 42 y.o.  post op day 3, meeting most post op milestones,     Plan:   1. Post-op care per primary team  - Meeting post op milestones  - Routine post-op advances  - Continue PRN pain medications  - Continue to encourage ambulation and use of incentive spirometer  - Reminder of care per primary team    We will sign off. Please re-consult with any additional questions. Patient will have a post-op appointment scheduled with Dr. Harris in 4 weeks.     Tiffanie Chen MD  PGY-2 OB/GYN

## 2023-02-10 NOTE — PLAN OF CARE
02/10/23 0946   Post-Acute Status   Post-Acute Authorization [facility] Status Set-up Complete/Auth obtained     Spoke to Shahla at [facility] and has accepted the patient ans will need updated wound care orders. Dr. Sykes notified and IDALIA is 2/11/23. Patient made aware.     Susie Christie RN  Case Management  [facility]  [phone]

## 2023-02-11 VITALS
SYSTOLIC BLOOD PRESSURE: 130 MMHG | OXYGEN SATURATION: 96 % | BODY MASS INDEX: 48.32 KG/M2 | HEART RATE: 83 BPM | WEIGHT: 290 LBS | HEIGHT: 65 IN | RESPIRATION RATE: 18 BRPM | TEMPERATURE: 98 F | DIASTOLIC BLOOD PRESSURE: 78 MMHG

## 2023-02-11 LAB
BASOPHILS # BLD AUTO: 0.03 K/UL (ref 0–0.2)
BASOPHILS NFR BLD: 0.3 % (ref 0–1.9)
CRP SERPL-MCNC: 152.7 MG/L (ref 0–8.2)
DIFFERENTIAL METHOD: ABNORMAL
EOSINOPHIL # BLD AUTO: 0.2 K/UL (ref 0–0.5)
EOSINOPHIL NFR BLD: 2 % (ref 0–8)
ERYTHROCYTE [DISTWIDTH] IN BLOOD BY AUTOMATED COUNT: 16.6 % (ref 11.5–14.5)
HCT VFR BLD AUTO: 31 % (ref 37–48.5)
HGB BLD-MCNC: 9.5 G/DL (ref 12–16)
IMM GRANULOCYTES # BLD AUTO: 0.02 K/UL (ref 0–0.04)
IMM GRANULOCYTES NFR BLD AUTO: 0.2 % (ref 0–0.5)
LYMPHOCYTES # BLD AUTO: 2 K/UL (ref 1–4.8)
LYMPHOCYTES NFR BLD: 22.6 % (ref 18–48)
MCH RBC QN AUTO: 25.3 PG (ref 27–31)
MCHC RBC AUTO-ENTMCNC: 30.6 G/DL (ref 32–36)
MCV RBC AUTO: 83 FL (ref 82–98)
MONOCYTES # BLD AUTO: 0.5 K/UL (ref 0.3–1)
MONOCYTES NFR BLD: 5.3 % (ref 4–15)
NEUTROPHILS # BLD AUTO: 6.3 K/UL (ref 1.8–7.7)
NEUTROPHILS NFR BLD: 69.6 % (ref 38–73)
NRBC BLD-RTO: 0 /100 WBC
PLATELET # BLD AUTO: 347 K/UL (ref 150–450)
PMV BLD AUTO: 9.4 FL (ref 9.2–12.9)
RBC # BLD AUTO: 3.75 M/UL (ref 4–5.4)
WBC # BLD AUTO: 9.03 K/UL (ref 3.9–12.7)

## 2023-02-11 PROCEDURE — 85025 COMPLETE CBC W/AUTO DIFF WBC: CPT | Performed by: STUDENT IN AN ORGANIZED HEALTH CARE EDUCATION/TRAINING PROGRAM

## 2023-02-11 PROCEDURE — 36415 COLL VENOUS BLD VENIPUNCTURE: CPT | Performed by: STUDENT IN AN ORGANIZED HEALTH CARE EDUCATION/TRAINING PROGRAM

## 2023-02-11 PROCEDURE — 94799 UNLISTED PULMONARY SVC/PX: CPT

## 2023-02-11 PROCEDURE — 25000003 PHARM REV CODE 250: Performed by: STUDENT IN AN ORGANIZED HEALTH CARE EDUCATION/TRAINING PROGRAM

## 2023-02-11 PROCEDURE — 94761 N-INVAS EAR/PLS OXIMETRY MLT: CPT

## 2023-02-11 PROCEDURE — 86140 C-REACTIVE PROTEIN: CPT | Performed by: STUDENT IN AN ORGANIZED HEALTH CARE EDUCATION/TRAINING PROGRAM

## 2023-02-11 RX ORDER — OXYCODONE HYDROCHLORIDE 5 MG/1
5 TABLET ORAL EVERY 4 HOURS PRN
Qty: 15 TABLET | Refills: 0 | Status: SHIPPED | OUTPATIENT
Start: 2023-02-11 | End: 2023-02-15 | Stop reason: SDUPTHER

## 2023-02-11 RX ADMIN — ATORVASTATIN CALCIUM 80 MG: 40 TABLET, FILM COATED ORAL at 09:02

## 2023-02-11 RX ADMIN — ALPRAZOLAM 0.5 MG: 0.5 TABLET ORAL at 02:02

## 2023-02-11 RX ADMIN — ESCITALOPRAM OXALATE 20 MG: 20 TABLET ORAL at 09:02

## 2023-02-11 RX ADMIN — CLONIDINE HYDROCHLORIDE 0.2 MG: 0.1 TABLET ORAL at 09:02

## 2023-02-11 RX ADMIN — IBUPROFEN 800 MG: 400 TABLET ORAL at 05:02

## 2023-02-11 RX ADMIN — AMLODIPINE BESYLATE 10 MG: 10 TABLET ORAL at 09:02

## 2023-02-11 RX ADMIN — ALVIMOPAN 12 MG: 12 CAPSULE ORAL at 09:02

## 2023-02-11 RX ADMIN — METOPROLOL TARTRATE 50 MG: 50 TABLET, FILM COATED ORAL at 09:02

## 2023-02-11 RX ADMIN — ACETAMINOPHEN 1000 MG: 500 TABLET ORAL at 05:02

## 2023-02-11 RX ADMIN — GABAPENTIN 300 MG: 300 CAPSULE ORAL at 09:02

## 2023-02-11 NOTE — NURSING
Patient discharged home  Patient iv removed  Reviewed discharge paperwork  Discharge medication obtained from pharmacy  Wheeled out per

## 2023-02-11 NOTE — DISCHARGE SUMMARY
[facility]  Colorectal Surgery  Discharge Summary      Patient Name: Lizbeth Leonard  MRN: [id number]  Admission Date: 2/7/2023  Hospital Length of Stay: 4 days  Discharge Date and Time:  02/11/2023 8:50 AM  Attending Physician: Eros Friedman MD   Discharging Provider: Cathy Estrella MD  Primary Care Provider: Kerry Tomas NP     HPI:  Ms. Leonrad underwent a emergent sigmoid colectomy with end-colostomy in January of this year.  She has a large fibroid uterus and was recommended to have a hysterectomy simultaneous with her colostomy closure.  Therefore she was referred to us.  She has tolerated her colostomy well.     Today I discussed the possibility of bariatric surgery prior to her colostomy closure in order to decrease the morbidity of that procedure.  She is not opposed to this strategy.  The plan will be for her to discuss this possibility with Dr. Recio.  If she is not able to have bariatric surgery then we will plan on a visit here to meet with gynecology and 1 of our colon rectal surgeons and proceed with colostomy closure.  Questions answered.    Procedure(s) (LRB):  CLOSURE, COLOSTOMY (N/A)  HYSTERECTOMY, TOTAL, ABDOMINAL (N/A)  CATHETERIZATION, URETER (Bilateral)  SALPINGECTOMY (Bilateral)  OOPHORECTOMY (Right)  SIGMOIDOSCOPY, FLEXIBLE (N/A)  MOBILIZATION, SPLENIC FLEXURE  COLECTOMY, SIGMOID     Hospital Course:  Please see the preoperative H&P and other available documentation for full details related to history prior to this admission.  Briefly, Lizbeth Leonard is a 43 y.o. female who was admitted following scheduled elective surgery for ostomy reversal.      Following a complete preoperative discussion of the risks and benefits of surgery with signed informed consent, the patient was taken to the operating room on 2/7/23 and underwent the above stated procedures. The patient tolerated surgery well and there were no complications. Please see the operative report for full intraoperative  findings and details.       Postoperatively, the patient did well and was transferred from the PACU to the floor in stable condition where they had a stable and uncomplicated hospital course.      Labs and vital signs remained stable and appropriate throughout course. Diet was advanced as tolerated and the patient's pain was controlled on oral pain medications without problem. Currently, the patient is doing well and is stable and appropriate for discharge at this time. Discharge instructions discussed with patient at bedside, all questions and concerns addressed.          Goals of Care Treatment Preferences:             Significant Diagnostic Studies: Labs: All labs within the past 24 hours have been reviewed    Pending Diagnostic Studies:     Procedure Component Value Units Date/Time    CBC auto differential [[id number]]     Order Status: Sent Lab Status: No result     Specimen: Blood     Specimen to Pathology, Surgery General Surgery (Colon Rectal) [[id number]] Collected: 02/07/23 1625    Order Status: Sent Lab Status: In process Updated: 02/08/23 1322    Specimen: Tissue         Final Active Diagnoses:    Diagnosis Date Noted POA    PRINCIPAL PROBLEM:  Colostomy in place [Z93.3] 02/07/2023 Not Applicable    Obesity [E66.9] 03/02/2022 Yes    HTN (hypertension) [I10] 03/02/2022 Yes      Problems Resolved During this Admission:      Discharged Condition: good    Disposition: Home or Self Care    Follow Up:   Follow-up Information     Dm Harris MD Follow up in 4 week(s).    Specialty: Gynecologic Oncology  Why: Post op visit  Contact information:  [address] [address]  [phone]                       Patient Instructions:      Pelvic Rest   Order Comments: For 12 weeks and cleared by MD     Notify your health care provider if you experience any of the following:   Order Comments: Vaginal bleeding that saturates 1 pad in 1 hour for > 1 hour     Lifting restrictions   Order  Comments: No lifting greater than 10 pounds for 6 weeks from day of surgery.  No pushing/pulling such as vacuuming or raking.  No straining, avoid constipation and take stool softeners as described and laxatives as needed.  No driving while on narcotics and until you can react quickly without pain.     Notify your health care provider if you experience any of the following:  temperature >100.4     Notify your health care provider if you experience any of the following:  persistent nausea and vomiting or diarrhea     Notify your health care provider if you experience any of the following:  severe uncontrolled pain     Notify your health care provider if you experience any of the following:  redness, tenderness, or signs of infection (pain, swelling, redness, odor or green/yellow discharge around incision site)     Notify your health care provider if you experience any of the following:  difficulty breathing or increased cough     Notify your health care provider if you experience any of the following:  severe persistent headache     Notify your health care provider if you experience any of the following:  worsening rash     Notify your health care provider if you experience any of the following:  persistent dizziness, light-headedness, or visual disturbances     Notify your health care provider if you experience any of the following:  increased confusion or weakness     No dressing needed   Order Comments: Keep ostomy site clean and dry. Can place gauze if needed.  You are OK to shower, do not soak or submerge in water - no swimming or baths. Shower daily.     Medications:  Reconciled Home Medications:      Medication List      START taking these medications    oxyCODONE 5 MG immediate release tablet  Commonly known as: ROXICODONE  Take 1 tablet (5 mg total) by mouth every 4 (four) hours as needed for Pain.        CONTINUE taking these medications    ALPRAZolam 0.5 MG tablet  Commonly known as: XANAX  Take 1 tablet  (0.5 mg total) by mouth 2 (two) times daily as needed for Anxiety.     * amLODIPine 10 MG tablet  Commonly known as: NORVASC  Take 1 tablet by mouth once daily     * amLODIPine 10 MG tablet  Commonly known as: NORVASC  Take 1 tablet (10 mg total) by mouth once daily.     benzonatate 100 MG capsule  Commonly known as: TESSALON  Take 100 mg by mouth 3 (three) times daily as needed for Cough.     cloNIDine 0.2 MG tablet  Commonly known as: CATAPRES  Take 1 tablet (0.2 mg total) by mouth 3 (three) times daily.     EScitalopram oxalate 20 MG tablet  Commonly known as: LEXAPRO  Take 1 tablet (20 mg total) by mouth once daily.     * metoprolol tartrate 50 MG tablet  Commonly known as: LOPRESSOR  Take 50 mg by mouth once daily.     * metoprolol tartrate 50 MG tablet  Commonly known as: LOPRESSOR  Take 1 tablet (50 mg total) by mouth 2 (two) times daily.     potassium chloride SA 20 MEQ tablet  Commonly known as: K-DUR,KLOR-CON  Daily     rosuvastatin 40 MG Tab  Commonly known as: CRESTOR  Take 1 tablet (40 mg total) by mouth every evening.     SUTAB 1.479-0.188- 0.225 gram tablet  Generic drug: sod sulf-pot chloride-mag sulf  Take 12 tablets by mouth once daily. Take as directed from provider. BIN: [id number], PCN: [id number], GROUP: [id number], MEMBER ID: [id number]         * This list has 4 medication(s) that are the same as other medications prescribed for you. Read the directions carefully, and ask your doctor or other care provider to review them with you.                Cathy Estrella MD  Colorectal Surgery  [facility]

## 2023-02-11 NOTE — PROGRESS NOTES
Raised voices coming from room around ~2am. Argument between pt and partner continued while nurse in the room. Pt's visitor took pt's phone without permission, left the room and went through it which further fueled the argument. Pt and nurse both agreed visitor needed to leave. Myself and Oc OC at the bedside while pt's visitor left. Consoled pt and given Xanax for anxiety and being very upset. Pt lives with man 4 hours away and will now need case management's services to assist with d/c process.

## 2023-02-11 NOTE — HOSPITAL COURSE
Please see the preoperative H&P and other available documentation for full details related to history prior to this admission.  Briefly, Lizbeth Leonard is a 43 y.o. female who was admitted following scheduled elective surgery for ostomy reversal.      Following a complete preoperative discussion of the risks and benefits of surgery with signed informed consent, the patient was taken to the operating room on 2/7/23 and underwent the above stated procedures. The patient tolerated surgery well and there were no complications. Please see the operative report for full intraoperative findings and details.       Postoperatively, the patient did well and was transferred from the PACU to the floor in stable condition where they had a stable and uncomplicated hospital course.      Labs and vital signs remained stable and appropriate throughout course. Diet was advanced as tolerated and the patient's pain was controlled on oral pain medications without problem. Currently, the patient is doing well and is stable and appropriate for discharge at this time. Discharge instructions discussed with patient at bedside, all questions and concerns addressed.

## 2023-02-13 ENCOUNTER — PATIENT MESSAGE (OUTPATIENT)
Dept: FAMILY MEDICINE | Facility: CLINIC | Age: 43
End: 2023-02-13
Payer: MEDICAID

## 2023-02-13 DIAGNOSIS — T14.8XXA OPEN WOUND OF SKIN: Primary | ICD-10-CM

## 2023-02-14 ENCOUNTER — PATIENT MESSAGE (OUTPATIENT)
Dept: FAMILY MEDICINE | Facility: CLINIC | Age: 43
End: 2023-02-14
Payer: MEDICAID

## 2023-02-15 ENCOUNTER — PATIENT MESSAGE (OUTPATIENT)
Dept: SURGERY | Facility: CLINIC | Age: 43
End: 2023-02-15
Payer: MEDICAID

## 2023-02-15 ENCOUNTER — TELEPHONE (OUTPATIENT)
Dept: SURGERY | Facility: CLINIC | Age: 43
End: 2023-02-15
Payer: MEDICAID

## 2023-02-15 DIAGNOSIS — K57.20 DIVERTICULITIS OF LARGE INTESTINE WITH PERFORATION WITHOUT BLEEDING: Primary | ICD-10-CM

## 2023-02-15 RX ORDER — OXYCODONE HYDROCHLORIDE 5 MG/1
5 TABLET ORAL EVERY 6 HOURS PRN
Qty: 20 TABLET | Refills: 0 | Status: SHIPPED | OUTPATIENT
Start: 2023-02-15

## 2023-02-15 RX ORDER — OXYCODONE HYDROCHLORIDE 5 MG/1
5 TABLET ORAL EVERY 4 HOURS PRN
Qty: 20 TABLET | Refills: 0 | Status: SHIPPED | OUTPATIENT
Start: 2023-02-15 | End: 2023-02-15 | Stop reason: SDUPTHER

## 2023-02-15 NOTE — TELEPHONE ENCOUNTER
----- Message from Selena Obrien NP sent at 2/15/2023  1:01 PM CST -----  Regarding: FW: refill  Contact: @[phone]  Please find out what other pharmacy they would like me to send this to.     Thanks  Selena  ----- Message -----  From: Tyler Vieyra  Sent: 2/15/2023  11:48 AM CST  To: Camille Walters Staff  Subject: refill                                           [facility] calling to inform they do not carry the med oxyCODONE (ROXICODONE) 5 MG immediate release tablet that was prescribed pls call and advise @[phone]

## 2023-02-15 NOTE — TELEPHONE ENCOUNTER
Left voicemail with callback number to inquire about pharmacy to send pain medication refill. Instructed to call back.

## 2023-02-17 NOTE — TELEPHONE ENCOUNTER
Cover my meds does not give me an option for oxycodone 5 mg. It never does. She will have to use the good rx coupon

## 2023-02-22 ENCOUNTER — TELEPHONE (OUTPATIENT)
Dept: SURGERY | Facility: CLINIC | Age: 43
End: 2023-02-22
Payer: MEDICAID

## 2023-02-22 NOTE — TELEPHONE ENCOUNTER
Called pt to get her appt rescheduled bc she cannot drive to [place] at this time.  Pt settled on 3/2 at 10:40am.  Pt verbalized understanding to all.

## 2023-02-23 ENCOUNTER — OFFICE VISIT (OUTPATIENT)
Dept: SURGERY | Facility: CLINIC | Age: 43
End: 2023-02-23
Payer: MEDICAID

## 2023-02-23 DIAGNOSIS — Z09 POSTOP CHECK: ICD-10-CM

## 2023-02-23 DIAGNOSIS — Z43.3 COLOSTOMY, EVALUATE: Primary | ICD-10-CM

## 2023-02-23 PROCEDURE — 99024 PR POST-OP FOLLOW-UP VISIT: ICD-10-PCS | Mod: 95,,, | Performed by: COLON & RECTAL SURGERY

## 2023-02-23 PROCEDURE — 99024 POSTOP FOLLOW-UP VISIT: CPT | Mod: 95,,, | Performed by: COLON & RECTAL SURGERY

## 2023-02-23 NOTE — PROGRESS NOTES
The patient location is: [place], [place]  The chief complaint leading to consultation is: Postop colostomy closure    Visit type: audiovisual    Face to Face time with patient: 10  10 minutes of total time spent on the encounter, which includes face to face time and non-face to face time preparing to see the patient (eg, review of tests), Obtaining and/or reviewing separately obtained history, Documenting clinical information in the electronic or other health record, Independently interpreting results (not separately reported) and communicating results to the patient/family/caregiver, or Care coordination (not separately reported).         Each patient to whom he or she provides medical services by telemedicine is:  (1) informed of the relationship between the physician and patient and the respective role of any other health care provider with respect to management of the patient; and (2) notified that he or she may decline to receive medical services by telemedicine and may withdraw from such care at any time.    Notes:  s/p hysterectomy and colostomy closure, 2/7/2023.  Doing well - good appetite, good bowel function.   Was seen in ED for some bleeding for old ostomy site - hgb stable.   This has decreased now and is less bloody - mostly dark.  Seen by home health several times a week.    Continue wound care. F/u as scheduled.

## 2023-02-24 ENCOUNTER — PATIENT MESSAGE (OUTPATIENT)
Dept: FAMILY MEDICINE | Facility: CLINIC | Age: 43
End: 2023-02-24
Payer: MEDICAID

## 2023-02-24 RX ORDER — BENZONATATE 100 MG/1
100 CAPSULE ORAL 3 TIMES DAILY PRN
Qty: 90 CAPSULE | Refills: 0 | Status: SHIPPED | OUTPATIENT
Start: 2023-02-24

## 2023-03-01 LAB
FINAL PATHOLOGIC DIAGNOSIS: NORMAL
GROSS: NORMAL
Lab: NORMAL
SUPPLEMENTAL DIAGNOSIS: NORMAL

## 2023-03-02 NOTE — PROGRESS NOTES
POST-OP CLINIC NOTE    SUBJECTIVE:    Lizbeth Leonard is a 43 y.o. year old  patient who is s/p FAVIAN/BS/RO/sigmoidsotomy reversal with end-to-end anastomosis on 2/7/23.  Final pathology c/w +fibroids, +adenomyosis, -tubes, -ovary.    She is eating a regular diet without difficulty. Bowel movements are normal. Pain is controlled.  Vaginal bleeding is normal.  Incision sites are clean, dry, and intact without evidence of infection.    REVIEW OF SYSTEMS  All systems reviewed and negative except as noted in HPI.    OBJECTIVE   Vitals:    03/07/23 1021   BP: (!) 148/72   Pulse: 79      Body mass index is 49.89 kg/m².      1. General: Well appearing, no apparent distress, alert and oriented.  2. Lymph: Neck symmetric without cervical or supraclavicular adenopathy or mass.  3. Lungs: Normal respiratory rate, no accessory muscle use.  4. Psych: Normal affect.  5. Abdomen:  non-distended, soft, non-tender, are no masses, no ascites, no hepatosplenomegaly. Incision sites are C/D/I without evidence of infection; colotomy takedown site with dressing  6. Skin: Warm, dry, no rashes or lesions.   7. Extremities: Bilateral lower extremities without edema or tenderness.  8. Genitourinary               Pelvic Examination including:                a. External genitalia are normal in appearance. No lesions noted.               b. Urethral meatus is normal size, location, and appearance.               c. Urethra is negative.               d. Bladder is nontender. No masses noted.               e. Vagina has normal mucosa with physiologic discharge. No lesions noted. Vaginal cuff is intact.              f. Uterus absent              g. Adnexa limited by body habitus     ASSESSMENT/PLAN:    1. Intramural, submucous, and subserous leiomyoma of uterus    2. Morbid obesity    3. Hypertension, unspecified type         Doing well post-operatively.  Operative findings again reviewed. Pathology report discussed.  There is no indication for routine  Pap smears.  F/U OB/Gyn.

## 2023-03-07 ENCOUNTER — OFFICE VISIT (OUTPATIENT)
Dept: GYNECOLOGIC ONCOLOGY | Facility: CLINIC | Age: 43
End: 2023-03-07
Payer: MEDICAID

## 2023-03-07 ENCOUNTER — OFFICE VISIT (OUTPATIENT)
Dept: SURGERY | Facility: CLINIC | Age: 43
End: 2023-03-07
Payer: MEDICAID

## 2023-03-07 VITALS
SYSTOLIC BLOOD PRESSURE: 148 MMHG | WEIGHT: 293 LBS | HEART RATE: 79 BPM | DIASTOLIC BLOOD PRESSURE: 72 MMHG | BODY MASS INDEX: 49.89 KG/M2

## 2023-03-07 DIAGNOSIS — D25.1 INTRAMURAL, SUBMUCOUS, AND SUBSEROUS LEIOMYOMA OF UTERUS: Primary | ICD-10-CM

## 2023-03-07 DIAGNOSIS — D25.2 INTRAMURAL, SUBMUCOUS, AND SUBSEROUS LEIOMYOMA OF UTERUS: Primary | ICD-10-CM

## 2023-03-07 DIAGNOSIS — D25.0 INTRAMURAL, SUBMUCOUS, AND SUBSEROUS LEIOMYOMA OF UTERUS: Primary | ICD-10-CM

## 2023-03-07 DIAGNOSIS — I10 HYPERTENSION, UNSPECIFIED TYPE: ICD-10-CM

## 2023-03-07 DIAGNOSIS — T81.89XA SURGICAL WOUND, NON HEALING, INITIAL ENCOUNTER: Primary | ICD-10-CM

## 2023-03-07 DIAGNOSIS — E66.01 MORBID OBESITY: ICD-10-CM

## 2023-03-07 PROCEDURE — 99213 OFFICE O/P EST LOW 20 MIN: CPT | Mod: PBBFAC,27 | Performed by: NURSE PRACTITIONER

## 2023-03-07 PROCEDURE — 99024 PR POST-OP FOLLOW-UP VISIT: ICD-10-PCS | Mod: ,,, | Performed by: NURSE PRACTITIONER

## 2023-03-07 PROCEDURE — 3078F PR MOST RECENT DIASTOLIC BLOOD PRESSURE < 80 MM HG: ICD-10-PCS | Mod: CPTII,,, | Performed by: OBSTETRICS & GYNECOLOGY

## 2023-03-07 PROCEDURE — 99024 POSTOP FOLLOW-UP VISIT: CPT | Mod: ,,, | Performed by: OBSTETRICS & GYNECOLOGY

## 2023-03-07 PROCEDURE — 1159F PR MEDICATION LIST DOCUMENTED IN MEDICAL RECORD: ICD-10-PCS | Mod: CPTII,,, | Performed by: NURSE PRACTITIONER

## 2023-03-07 PROCEDURE — 99024 PR POST-OP FOLLOW-UP VISIT: ICD-10-PCS | Mod: ,,, | Performed by: OBSTETRICS & GYNECOLOGY

## 2023-03-07 PROCEDURE — 1159F MED LIST DOCD IN RCRD: CPT | Mod: CPTII,,, | Performed by: OBSTETRICS & GYNECOLOGY

## 2023-03-07 PROCEDURE — 1159F PR MEDICATION LIST DOCUMENTED IN MEDICAL RECORD: ICD-10-PCS | Mod: CPTII,,, | Performed by: OBSTETRICS & GYNECOLOGY

## 2023-03-07 PROCEDURE — 3008F BODY MASS INDEX DOCD: CPT | Mod: CPTII,,, | Performed by: OBSTETRICS & GYNECOLOGY

## 2023-03-07 PROCEDURE — 99999 PR PBB SHADOW E&M-EST. PATIENT-LVL III: CPT | Mod: PBBFAC,,, | Performed by: NURSE PRACTITIONER

## 2023-03-07 PROCEDURE — 1160F PR REVIEW ALL MEDS BY PRESCRIBER/CLIN PHARMACIST DOCUMENTED: ICD-10-PCS | Mod: CPTII,,, | Performed by: NURSE PRACTITIONER

## 2023-03-07 PROCEDURE — 3078F DIAST BP <80 MM HG: CPT | Mod: CPTII,,, | Performed by: OBSTETRICS & GYNECOLOGY

## 2023-03-07 PROCEDURE — 99024 POSTOP FOLLOW-UP VISIT: CPT | Mod: ,,, | Performed by: NURSE PRACTITIONER

## 2023-03-07 PROCEDURE — 99999 PR PBB SHADOW E&M-EST. PATIENT-LVL III: ICD-10-PCS | Mod: PBBFAC,,, | Performed by: OBSTETRICS & GYNECOLOGY

## 2023-03-07 PROCEDURE — 1160F RVW MEDS BY RX/DR IN RCRD: CPT | Mod: CPTII,,, | Performed by: NURSE PRACTITIONER

## 2023-03-07 PROCEDURE — 3008F PR BODY MASS INDEX (BMI) DOCUMENTED: ICD-10-PCS | Mod: CPTII,,, | Performed by: OBSTETRICS & GYNECOLOGY

## 2023-03-07 PROCEDURE — 99213 OFFICE O/P EST LOW 20 MIN: CPT | Mod: PBBFAC | Performed by: OBSTETRICS & GYNECOLOGY

## 2023-03-07 PROCEDURE — 99999 PR PBB SHADOW E&M-EST. PATIENT-LVL III: ICD-10-PCS | Mod: PBBFAC,,, | Performed by: NURSE PRACTITIONER

## 2023-03-07 PROCEDURE — 3077F PR MOST RECENT SYSTOLIC BLOOD PRESSURE >= 140 MM HG: ICD-10-PCS | Mod: CPTII,,, | Performed by: OBSTETRICS & GYNECOLOGY

## 2023-03-07 PROCEDURE — 99999 PR PBB SHADOW E&M-EST. PATIENT-LVL III: CPT | Mod: PBBFAC,,, | Performed by: OBSTETRICS & GYNECOLOGY

## 2023-03-07 PROCEDURE — 3077F SYST BP >= 140 MM HG: CPT | Mod: CPTII,,, | Performed by: OBSTETRICS & GYNECOLOGY

## 2023-03-07 PROCEDURE — 1159F MED LIST DOCD IN RCRD: CPT | Mod: CPTII,,, | Performed by: NURSE PRACTITIONER

## 2023-03-07 NOTE — PROGRESS NOTES
Ms. Leonard is unknown to me and comes today after surgery on 2/7/23 with Dr. Friedman for colostomy closure. Pt is seeing me for post op evaluation of old ostomy site.  Minimal sangenous drainage. Changing wet to dry dressing 1-2x/day.  HH coming weekly assisting with dressing change and supplies    ASSESSMENT:  LLQ 1cm x 0.5cm x 2 cm deep wound    3/7/23   - single suture removed      PLAN:  Patient instructed to do the following routine for wound care:  Aquacel ag strip placed lightly into wound  Cover with dry dressing and paper or mepore tape  Pt also counseled on skin care, nutrition, hydration and ADL.  Subsuquent HH orders faxed to [facility] [place], [place]  I spent greater than 50% of this 30 minute visit in face to face counseling.  Return clinic visit recommended prn. Has appt with local wound care center at end of month if no improvement by then.

## 2023-03-27 ENCOUNTER — PATIENT MESSAGE (OUTPATIENT)
Dept: FAMILY MEDICINE | Facility: CLINIC | Age: 43
End: 2023-03-27
Payer: MEDICAID

## 2023-05-08 ENCOUNTER — DOCUMENT SCAN (OUTPATIENT)
Dept: HOME HEALTH SERVICES | Facility: HOSPITAL | Age: 43
End: 2023-05-08
Payer: MEDICAID

## 2024-04-17 NOTE — TELEPHONE ENCOUNTER
Left voicemail with callback number in reference to scheduling combo surgery with Dr. Harris and Dr. Friedman. Instructed to call back for any questions. Explained that we are in the process of rescheduling surgery once dates are coordinated.  
No

## (undated) DEVICE — SUT 2-0 27IN VICRYL PLUS CT

## (undated) DEVICE — DRAPE INCISE IOBAN 2 23X17IN

## (undated) DEVICE — SYR 50ML CATH TIP

## (undated) DEVICE — CATH POLLACK OPEN-END FLEXI-TI

## (undated) DEVICE — STAPLER ECHELON PWR CIR 29MM

## (undated) DEVICE — GOWN SMART IMP BREATHABLE XXLG

## (undated) DEVICE — DRESSING TRANS 4X4 TEGADERM

## (undated) DEVICE — SUT 2-0 VICRYL / SH (J417)

## (undated) DEVICE — SOL IRR NACL .9% 3000ML

## (undated) DEVICE — COVER LIGHT HANDLE 80/CA

## (undated) DEVICE — COVER MAYO STND XL 30X57IN

## (undated) DEVICE — TRAY SKIN SCRUB WET PREMIUM

## (undated) DEVICE — SUT 1 36IN COATED VICRYL UN

## (undated) DEVICE — BOVIE SUCTION

## (undated) DEVICE — BOWL STERILE LARGE 32OZ

## (undated) DEVICE — WIRE GUIDE 0.038OLD

## (undated) DEVICE — NDL BOX COUNTER

## (undated) DEVICE — SUT 2/0 54IN COATED VICRYL

## (undated) DEVICE — Device

## (undated) DEVICE — SOL WATER STRL IRR 1000ML

## (undated) DEVICE — GAUZE SPONGE PEANUT STRL

## (undated) DEVICE — SUT CTD VICRYL VIL BR CR/SH

## (undated) DEVICE — SET IRR URLGY 2LINE UNIV SPIKE

## (undated) DEVICE — RETAINER VISCERA FISH GREER MD

## (undated) DEVICE — SUT MONOCRYL 4-0 PS-1 UND

## (undated) DEVICE — LUBRICANT SURGILUBE 2 OZ

## (undated) DEVICE — SUT CTD VICRYL 1VIL BR 54IN

## (undated) DEVICE — SEE MEDLINE ITEM 156902

## (undated) DEVICE — SUT 2-0 12-18IN SILK

## (undated) DEVICE — LEGGING CLEAR POLY 2/PACK

## (undated) DEVICE — APPLICATOR CHLORAPREP ORN 26ML

## (undated) DEVICE — DRAPE UINDERBUT GRAD PCH

## (undated) DEVICE — SALINE .45% 1000ML VITEK2

## (undated) DEVICE — SUT SILK 3-0 SH 18IN BLACK

## (undated) DEVICE — SPONGE DERMACEA GAUZE 4X4

## (undated) DEVICE — PAD PINK TRENDELENBURG POS XL

## (undated) DEVICE — TIP YANKAUERS BULB NO VENT

## (undated) DEVICE — NDL 22GA X1 1/2 REG BEVEL

## (undated) DEVICE — TRAY CATH FOL SIL URIMTR 16FR

## (undated) DEVICE — GOWN SURGICAL X-LARGE

## (undated) DEVICE — STAPLER CONTOUR 40MM GREEN

## (undated) DEVICE — DRESSING ABSRBNT ISLAND 3.6X8

## (undated) DEVICE — ELECTRODE REM PLYHSV RETURN 9

## (undated) DEVICE — ADHESIVE DERMABOND ADVANCED

## (undated) DEVICE — DRAPE LEGGINGS CUFF 33X51IN

## (undated) DEVICE — SOL NS 1000CC

## (undated) DEVICE — NDL SPINAL 18GX3.5 SPINOCAN

## (undated) DEVICE — SYR ONLY LUER LOCK 20CC

## (undated) DEVICE — DRAPE ABDOMINAL TIBURON 14X11

## (undated) DEVICE — DRAPE CORETEMP FLD WRM 56X62IN

## (undated) DEVICE — DRAPE STERI INSTRUMENT 1018

## (undated) DEVICE — CATH IV INTROCAN 14G X 2.

## (undated) DEVICE — SUT VICRYL+ 1 CT1 18IN

## (undated) DEVICE — SEALER LIGASURE IMPACT 18CM

## (undated) DEVICE — CUTTER PROXIMATE BLUE 75MM

## (undated) DEVICE — SUT CTD VICRYL 2-0 VIL BR

## (undated) DEVICE — KIT PREVENA PLUS

## (undated) DEVICE — SYR 30CC LUER LOCK

## (undated) DEVICE — TOWEL OR DISP STRL BLUE 4/PK